# Patient Record
Sex: FEMALE | Race: BLACK OR AFRICAN AMERICAN
[De-identification: names, ages, dates, MRNs, and addresses within clinical notes are randomized per-mention and may not be internally consistent; named-entity substitution may affect disease eponyms.]

---

## 2017-03-09 ENCOUNTER — HOSPITAL ENCOUNTER (EMERGENCY)
Dept: HOSPITAL 68 - ERH | Age: 42
End: 2017-03-09
Payer: COMMERCIAL

## 2017-03-09 VITALS — SYSTOLIC BLOOD PRESSURE: 116 MMHG | DIASTOLIC BLOOD PRESSURE: 76 MMHG

## 2017-03-09 DIAGNOSIS — T24.002A: Primary | ICD-10-CM

## 2017-03-09 DIAGNOSIS — K08.89: ICD-10-CM
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
## 2017-03-14 ENCOUNTER — HOSPITAL ENCOUNTER (EMERGENCY)
Dept: HOSPITAL 68 - ERH | Age: 42
End: 2017-03-14
Payer: COMMERCIAL

## 2017-03-14 VITALS — SYSTOLIC BLOOD PRESSURE: 130 MMHG | DIASTOLIC BLOOD PRESSURE: 86 MMHG

## 2017-03-14 VITALS — WEIGHT: 174 LBS | BODY MASS INDEX: 28.99 KG/M2 | HEIGHT: 65 IN

## 2017-03-14 DIAGNOSIS — K08.89: ICD-10-CM

## 2017-03-14 DIAGNOSIS — K04.7: Primary | ICD-10-CM
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
## 2017-03-19 ENCOUNTER — HOSPITAL ENCOUNTER (EMERGENCY)
Dept: HOSPITAL 68 - ERH | Age: 42
End: 2017-03-19
Payer: COMMERCIAL

## 2017-03-19 VITALS — SYSTOLIC BLOOD PRESSURE: 104 MMHG | DIASTOLIC BLOOD PRESSURE: 64 MMHG

## 2017-03-19 VITALS — BODY MASS INDEX: 28.32 KG/M2 | WEIGHT: 170 LBS | HEIGHT: 65 IN

## 2017-03-19 DIAGNOSIS — R10.32: Primary | ICD-10-CM

## 2017-03-19 NOTE — ED GI/GU/ABDOMINAL COMPLAINT
History of Present Illness
 
General
Chief Complaint: General Adult
Stated Complaint: "PERIOD CRAMPS"
Source: patient, old records
Exam Limitations: no limitations
 
Vital Signs & Intake/Output
Vital Signs & Intake/Output
 Vital Signs
 
 
Date Time Temp Pulse Resp B/P Pulse O2 O2 Flow FiO2
 
     Ox Delivery Rate 
 
03/19 2116 98.1 73 16 104/64 98 Room Air  
 
03/19 1945 97.5 72 18 136/79 98 Room Air  
 
 
 ED Intake and Output
 
 
 03/20 0000 03/19 1200
 
Intake Total 0 
 
Output Total  
 
Balance 0 
 
   
 
Intake, Oral 0 
 
Patient 170 lb 
 
Weight  
 
 
Allergies
Coded Allergies:
latex (Severe, ITCHY RASH 11/04/16)
lactose (Severe, LACTOSE INTOLERANT 11/04/16)
tramadol (Severe, ITCHING 11/04/16)
 
Reconcile Medications
Amoxicillin 875 MG TABLET   1 TAB PO BID DENTAL INFECTION
Hydrocodone/Acetaminophen (Norco 5-325 Tablet) 5 MG-325 MG TABLET   1 TAB PO 
BIDP PRN BREAKTHROUGH PAIN
Ibuprofen 800 MG TABLET   1 TAB PO PRN PAIN  (Reported)
 
Triage Note:
PT TO TRIAGE WITH C/O LLQ ABD PAIN 8/10 SINCE THIS
 MORNING. HX OF OVARIAN CYST. PT DENIES N/V/D,
 DENIES URINARY S/S, DENIES CHEST PAIN,SOB.
 PT AFEBRILE IN TRIAGE, VSS.
Triage Nurses Notes Reviewed? yes
LMP (ages 10-50): now
Pregnant? N
Is pt currently breastfeeding? No
Onset: Abrupt
Duration: day(s): (1), constant
Timing: recent history
Quality/Severity: aching, cramping
Severity Numbers: 4
Location: suprapubic
Radiation: suprapubic
Activities at Onset: none
Prior Abdominal Problems: similar symptoms (H/O OVARIAN CYST)
No Modifying Factors: none
Associated Symptoms: DENIES
HPI:
41-year-old female with history of ovarian cyst presents emergency room 
complaining of suprapubic abdominal pain described as cramping 8 out of 10 
intermittent waxing and waning in intensity that came on today.  The patient 
states her menstrual cycle began today as well as scheduled.  She has a history 
of ovarian cyst and is followed by Dr. Huerta.  She states this pain feels 
similar.  It is nonradiating.  She took her last Vicodin today which she had 
after being seen here last week for a burn states helped with the pain.  She 
denies any diarrhea.  She denies chance of pregnancy vaginal discharge.  No 
history of sexual transmitted disease.  No fever no chills no urinary urgency 
frequency dysuria.
 
(LUIS CHRISTOPHER)
 
Past History
 
Travel History
Traveled to [place] past 21 day No
 
Medical History
Any Pertinent Medical History? see below for history
Neurological: NONE
EENT: NONE
Cardiovascular: NONE
Respiratory: asthma
Gastrointestinal: peptic ulcer disease, GASTRITIS
Hepatic: NONE
Renal: NONE
Musculoskeletal: sciatica, CARPAL TUNNEL C5/6 HERNIATED DISCS
Psychiatric: anxiety, insomnia, opioid dependence, PANIC ATTACKS
Endocrine: NONE
Blood Disorders: SMV THROMBOSIS "CLOTTING DISORDER"
Cancer(s): NONE
GYN/Reproductive: NONE
Tetanus Vaccine: 01/24/15
 
Surgical History
Surgical History: non-contributory
 
Psychosocial History
Who do you live with Friend
Services at Home None
What is your primary language English
Tobacco Use: Current Daily Use
Daily Tobacco Use Amount/Type: => 5 Cigarettes daily
 
Family History
Hx Contributory? No
(LUIS CHRISTOPHER)
 
Review of Systems
 
Review of Systems
Constitutional:
Reports: see HPI. 
All Other Systems: Reviewed and Negative
Comments
Review of systems: See HPI, All other systems negative.
Constitutional, no chills no fever, no malaise 
HEENT: no sore throat no congestion, no ear pain
Cardiovascular: No chest pain , no palpitation 
Skin,  no rashes, no change in skin
Respiratory: No dyspnea no cough no  sputum no  hemoptysis
GI: No nausea no vomiting, no diarrhea, no bloating/constipation
: No dysuria No hematuria, no frequency, no discharge
Muscle skeletal: No joint pain, no back pain, no neck pain,
Neurologic:  no headache
Psych: No stress 
Heme/endocrine: No bruising no bleeding 
Immunology: No lymphadenopathy,
(LUIS CHRISTOPHER)
 
Physical Exam
 
Physical Exam
General Appearance: well developed/nourished, alert, awake
Gastrointestinal: soft
Comments:
Well-developed well-nourished person in no acute distress
HEENT: Normal EENT exam; PERRL, EOMI, HEAD is atraumatic. moist mucous 
membranes.  
Neck: Supple,  normal range of motion 
Back: Nontender, no CVA tenderness. Full range of motion
Cardiovascular: Regular rate and rhythms no murmurs rubs
Respiratory:  No respiratory distress.  Patient speaking in full complete 
sentences. Breath sounds clear to auscultation bilaterally: NO W/R/R
Abdomen: Soft, nontender nondistended, no appreciable organomegaly. Normal bowel
sounds. No rebound/guarding, is no right lower quadrant tenderness negative 
Rovsing sign and negative obturator sign
Extremity: No edema, full range of motion of extremities,
Neuro: Alert oriented x3, motor sensory normal, There were no obvious focal 
neurologic abnormalities.
Skin: No appreciable rash on exposed skin, skin is warm and dry.
Psych: Mood and affect is normal, memory and judgment is normal.
 
Core Measures
ACS in differential dx? No
Severe Sepsis Present: No
Septic Shock Present: No
(LUIS CHRISTOPHER)
 
Progress
Differential Diagnosis: ectopic pregnancy, inflamm bowel dis, intrauterine 
pregnancy, ovarian cyst, ovarian torsion, SBO, threatened AB, UTI/pyelo
Plan of Care:
 Orders
 
 
Procedure Date/time Status
 
URINE PREGNANCY 03/19 2016 Complete
 
URINALYSIS 03/19 2016 Complete
 
 
 Laboratory Tests
 
 
 
03/19/17 2024:
Urinalysis MANY  H, Urine Color PINK  H, Urine Clarity CLEAR, Urine pH 6.0, Ur 
Specific Gravity <= 1.005, Urine Protein 100  H, Urine Ketones NEG, Urine 
Nitrite NEG, Urine Bilirubin NEG, Urine Urobilinogen 0.2, Ur Leukocyte Esterase 
TRACE  H, Ur Microscopic SEDIMENT EXAMINED, Urine RBC 10-15  H, Urine WBC 1-3  H
, Ur Epithelial Cells FEW, Urine Bacteria FEW  H, Urine Mucus FEW, Urine 
Hemoglobin LARGE  H, Urine Glucose NEG, Urine Pregnancy Test NEGATIVE
Patient clinically appears well, I advised she follow up with her OB/GYN Dr. Worley tomorrow she may need an outpatient ultrasound prescription for Vicodin 
was provided, advised she continue taking ibuprofen 800 mg every 8 hours heating
pads, return anytime sooner with any concerns she feels comfortable this plan 
answered all her questions cleared for discharge
(LUIS CHRISTOPHER)
Initial ED EKG: none
(LUIS CHRISTOPHER)
 
Departure
 
Departure
Time of Disposition: 2109
Disposition: HOME OR SELF CARE
Condition: Stable
Clinical Impression
Primary Impression: Abdominal pain
Referrals:
LESLY WATTS,GREYSON HENRY (PCP/Family)
 
Additional Instructions:
Follow-up with your OB/GYN tomorrow as you may require an outpatient ultrasound.
 Vicodin for breakthrough pain.  Use caution as this is a narcotic and highly 
addictive no driving or drinking alcohol while taking.  Ibuprofen 800 mg every 8
hours
Departure Forms:
Customer Survey
General Discharge Information
Prescriptions:
Current Visit Scripts
Hydrocodone/Acetaminophen (Norco 5-325 Tablet) 1 TAB PO BIDP PRN BREAKTHROUGH 
PAIN
     #8 TAB 
 
 
(MIRIAM RIDDLE,LUIS)
 
PA/NP Co-Sign Statement
Statement:
ED Attending supervision documentation-
 
[] I saw and evaluated the patient. I have also reviewed all the pertinent lab 
results and diagnostic results. I agree with the findings and the plan of care 
as documented in the PA's/NP's documentation. 
 
[x] I have reviewed the ED Record and agree with the PA's/NP's documentation.
 
[] Additions or exceptions (if any) to the PAs/NP's note and plan are 
summarized below:
[]
 
(JAYNA WATTS,JOSE MATOS)

## 2017-03-30 ENCOUNTER — HOSPITAL ENCOUNTER (EMERGENCY)
Dept: HOSPITAL 68 - ERH | Age: 42
LOS: 1 days | End: 2017-03-31
Payer: COMMERCIAL

## 2017-03-30 VITALS — WEIGHT: 170 LBS | BODY MASS INDEX: 28.32 KG/M2 | HEIGHT: 65 IN

## 2017-03-30 DIAGNOSIS — K08.89: Primary | ICD-10-CM

## 2017-03-31 VITALS — SYSTOLIC BLOOD PRESSURE: 127 MMHG | DIASTOLIC BLOOD PRESSURE: 75 MMHG

## 2017-03-31 NOTE — ED THROAT/DENTAL COMPLAINT
History of Present Illness
 
General
Chief Complaint: Sore Throat, Dental Pain
Stated Complaint: DENTAL PAIN
Source: patient
Exam Limitations: no limitations
 
Vital Signs & Intake/Output
Vital Signs & Intake/Output
 Vital Signs
 
 
Date Time Temp Pulse Resp B/P Pulse O2 O2 Flow FiO2
 
     Ox Delivery Rate 
 
03/31 0016 98.9 88 18 127/75 97 Room Air  
 
03/30 2225 99.2 92 18 143/88 96 Room Air  
 
 
 ED Intake and Output
 
 
 03/31 0000 03/30 1200
 
Intake Total  
 
Output Total  
 
Balance  
 
   
 
Patient 170 lb 
 
Weight  
 
 
Allergies
Coded Allergies:
latex (Severe, ITCHY RASH 11/04/16)
lactose (Severe, LACTOSE INTOLERANT 11/04/16)
tramadol (Severe, ITCHING 11/04/16)
 
Reconcile Medications
Amoxicillin 875 MG TABLET   1 TAB PO BID DENTAL INFECTION
Hydrocodone/Acetaminophen (Hydrocodon-Acetaminophen 5-325) 5 MG-325 MG TABLET   
1-2 TAB PO Q4-6 PRN PRN pain
Hydrocodone/Acetaminophen (Norco 5-325 Tablet) 5 MG-325 MG TABLET   1 TAB PO 
BIDP PRN BREAKTHROUGH PAIN
Ibuprofen 800 MG TABLET   1 TAB PO PRN PAIN  (Reported)
 
Triage Note:
PT TO ED C/O RT LOWER TOOTH PAIN TODAY
Triage Nurses Notes Reviewed? yes
Onset: Abrupt
Duration: day(s):, intermittent
Timing: recent history
Severity: moderate, severe
No Modifying Factors: none
Pregnant: No
Patient currently breastfeeds: No
HPI:
41-year-old female comes into emergency room with complaints of dental pain has 
been going on for the past few days.  History of dental pain previously.  Pain 
is sharp.  Continuous.  Located in the right frontal tooth as well as right 
upper molar.  Denies any other associated symptoms.  Nonradiating.
(JUANITA HIGGINBOTHAM)
 
Past History
 
Travel History
Traveled to [place] past 21 day No
 
Medical History
Any Pertinent Medical History? see below for history
Neurological: NONE
EENT: NONE
Cardiovascular: NONE
Respiratory: asthma
Gastrointestinal: peptic ulcer disease, GASTRITIS
Hepatic: NONE
Renal: NONE
Musculoskeletal: sciatica, CARPAL TUNNEL C5/6 HERNIATED DISCS
Psychiatric: anxiety, insomnia, opioid dependence, PANIC ATTACKS
Endocrine: NONE
Blood Disorders: SMV THROMBOSIS "CLOTTING DISORDER"
Cancer(s): NONE
GYN/Reproductive: NONE
Tetanus Vaccine: 01/24/15
 
Surgical History
Surgical History: non-contributory
 
Psychosocial History
Who do you live with Friend
Services at Home None
What is your primary language English
Tobacco Use: Current Daily Use
Daily Tobacco Use Amount/Type: => 5 Cigarettes daily
ETOH Use: occasional use
Illicit Drug Use: denies illicit drug use
 
Family History
Hx Contributory? No
(JUANITA HIGGINBOTHAM)
 
Review of Systems
 
Review of Systems
Constitutional:
Reports: no symptoms. 
EENTM:
Reports: see HPI. 
Respiratory:
Reports: no symptoms. 
Cardiovascular:
Reports: no symptoms. 
GI:
Reports: no symptoms. 
Genitourinary:
Reports: no symptoms. 
Musculoskeletal:
Reports: no symptoms. 
Skin:
Reports: no symptoms. 
Neurological/Psychological:
Reports: no symptoms. 
Hematologic/Endocrine:
Reports: no symptoms. 
Immunologic/Allergic:
Reports: no symptoms. 
All Other Systems: Reviewed and Negative
(JUANITA HIGGINBOTHAM)
 
Physical Exam
 
Physical Exam
General Appearance: well developed/nourished, no apparent distress, alert
Head: atraumatic, normal appearance
Eyes:
Bilateral: normal appearance. 
Nose: normal inspection
Mouth/Throat: normal mouth inspection, dental tenderness
Neck: normal inspection, full range of motion
Cardiovascular/Respiratory: no respiratory distress
Back: normal inspection
Neurologic/Psych: awake, alert, oriented x 3
Skin: intact, normal color
 
Diagram
Dental:
 
  1) tenderness
  2) Tenderness
 
Core Measures
ACS in differential dx? No
Severe Sepsis Present: No
Septic Shock Present: No
(JUANITA HIGGINBOTHAM)
 
Progress
Differential Diagnosis: aspirated tooth, carious tooth, epiglottitis, Ludwigs 
angina, meningitis, odontogenic abscess, bradley-tonsillar abscess, pharyngeal for.
body, stomatitis/gingivitis, strep pharyngitis, tooth fracture
Plan of Care:
3/31/2017 12:16:01 AM
 
Patient clinically looks well.  Nontoxic-appearing.  No signs of infection.  At 
this time patient will not be started on oral antibiotics.  Follow-up with 
dentist.  Return if any other concerns worsening symptoms.
(JUANITA HIGGINBOTHAM)
 
Departure
 
Departure
Disposition: HOME OR SELF CARE
Condition: Stable
Clinical Impression
Primary Impression: Odontalgia
Referrals:
UNKNOWN (PCP/Family)
 
Additional Instructions:
Take Vicodin for pain.  Follow-up with dentist.  Return if any other concerns 
worsening symptoms.
 
Please go over all results of today's visit with your primary care doctor.  
Contact your primary care doctor to let them know you were here in the emergency
room.  There may be nonspecific findings which may not be related to your visit 
today here in the emergency room but may require further evaluation and chronic 
monitoring by your primary care doctor.  If you had a laceration today the 
chance of foreign body always remains. You should follow-up with your primary 
care doctor for recheck in 3-5 days for a wound check.  If you had an x-ray done
there is a chance that a fracture could have been missed on initial read and you
should follow-up with your primary care doctor for repeat x-rays if symptoms 
persist.  If your blood pressure was elevated here in the emergency room please 
have rechecked by her primary care doctor within the next 48 hours by your 
primary care doctor.  If you were prescribed a narcotic here in the emergency 
room or any type of controlled substances you're not allowed to drive while 
taking this medication or operate any type of heavy machinery.  Narcotics can 
make you feel lightheaded dizziness nausea and can cause constipation.  You may 
need to  a stool softener.  Thank you for choosing [facility] 
emergency room.  Please return to the emergency room immediately if you have any
other concerns worsening of symptoms.
 
Departure Forms:
Customer Survey
General Discharge Information
Prescriptions:
Current Visit Scripts
Hydrocodone/Acetaminophen (Hydrocodon-Acetaminophen 5-325) 1-2 TAB PO Q4-6 PRN 
PRN pain
     #8 TAB 
 
 
(JUANITA HIGGINBOTHAM)
 
PA/NP Co-Sign Statement
Statement:
ED Attending supervision documentation-
 
[] I saw and evaluated the patient. I have also reviewed all the pertinent lab 
results and diagnostic results. I agree with the findings and the plan of care 
as documented in the PA's/NP's documentation. 
 
x I have reviewed the ED Record and agree with the PA's/NP's documentation.
 
[] Additions or exceptions (if any) to the PAs/NP's note and plan are 
summarized below:
[]
 
(JODY WATTS,JOEL)

## 2017-04-12 ENCOUNTER — HOSPITAL ENCOUNTER (EMERGENCY)
Dept: HOSPITAL 68 - ERH | Age: 42
End: 2017-04-12
Payer: COMMERCIAL

## 2017-04-12 VITALS — DIASTOLIC BLOOD PRESSURE: 82 MMHG | SYSTOLIC BLOOD PRESSURE: 122 MMHG

## 2017-04-12 VITALS — BODY MASS INDEX: 28.32 KG/M2 | HEIGHT: 65 IN | WEIGHT: 170 LBS

## 2017-04-12 DIAGNOSIS — J40: Primary | ICD-10-CM

## 2017-04-12 DIAGNOSIS — Z72.0: ICD-10-CM

## 2017-04-12 NOTE — RADIOLOGY REPORT
EXAMINATION:
CHEST 2 VIEWS
 
CLINICAL INFORMATION:
Cough, fever.
 
COMPARISON:
02/06/2014.
 
TECHNIQUE:
PA and lateral views of the chest were obtained.
 
FINDINGS:
The cardiac silhouette is not enlarged. The mediastinal and hilar contours
are unremarkable. There are neither pleural effusions nor pneumothoraces.
There are no consolidations. The osseous structures are unremarkable.
 
IMPRESSION:
No evidence for acute disease.

## 2017-04-19 ENCOUNTER — HOSPITAL ENCOUNTER (EMERGENCY)
Dept: HOSPITAL 68 - ERH | Age: 42
End: 2017-04-19
Payer: COMMERCIAL

## 2017-04-19 VITALS — DIASTOLIC BLOOD PRESSURE: 75 MMHG | SYSTOLIC BLOOD PRESSURE: 119 MMHG

## 2017-04-19 VITALS — HEIGHT: 65 IN | BODY MASS INDEX: 28.32 KG/M2 | WEIGHT: 170 LBS

## 2017-04-19 DIAGNOSIS — K08.89: Primary | ICD-10-CM

## 2017-04-19 NOTE — ED THROAT/DENTAL COMPLAINT
History of Present Illness
 
General
Chief Complaint: Sore Throat, Dental Pain
Stated Complaint: DENTAL PAIN
Source: patient, old records
Exam Limitations: no limitations
 
Vital Signs & Intake/Output
Vital Signs & Intake/Output
 Vital Signs
 
 
Date Time Temp Pulse Resp B/P B/P Pulse O2 O2 Flow FiO2
 
     Mean Ox Delivery Rate 
 
04/19 1405 99.0 72 18 119/75  97 Room Air  
 
 
Allergies
Coded Allergies:
latex (Severe, ITCHY RASH 04/12/17)
lactose (Severe, LACTOSE INTOLERANT 04/12/17)
tramadol (Severe, ITCHING 04/12/17)
 
Reconcile Medications
Amoxicillin 500 MG TABLET   1 TAB PO TID BRONCHITIS
Codeine Phosphate/Guaifenesi (Cheratussin AC Syrup) 10 MG-100 MG/5 ML LIQUID   
10 ML PO Q6P PRN COUGH
Hydrocodone/Acetaminophen (Norco 5-325 Tablet) 5 MG-325 MG TABLET   1 TAB PO Q4-
6 PRN PRN pain
Ibuprofen 800 MG TABLET   1 TAB PO TID PRN pain
 
Triage Note:
PT TO TRIAGE WITH TOOTHACHE 10/10, PT TOOK TYLENOL
THIS MORNING WITH NO PAIN RELIEF. VSS.
Triage Nurses Notes Reviewed? yes
Onset: Abrupt
Duration: day(s): (2), constant
Timing: recent history
Injury Environment: home
Severity: moderate
Severity Numbers: 10
Modifying Factors:
Worsens With: eating. 
Associated Symptoms: DENIES
Pregnant: No
Patient currently breastfeeds: No
HPI:
41-year-old female presents emergency room complaining of a severe right lower 
toothache 10 out of 10 for the past 2 days at the site of a previously cracked 
tooth.  She states she is scheduled to see a dentist on Friday.  She's been 
taking Tylenol without any improvement.  Pain is worse with palpation eating.  
She states she was using a toothpick today and believes she made the pain 
worse.No sore throat no difficulty swallowing no fever no chills
(LUIS CHRISTOPHER)
 
Past History
 
Travel History
Traveled to [place] past 21 day No
 
Medical History
Any Pertinent Medical History? see below for history
Neurological: NONE
EENT: NONE
Cardiovascular: NONE
Respiratory: asthma
Gastrointestinal: peptic ulcer disease, GASTRITIS
Hepatic: NONE
Renal: NONE
Musculoskeletal: sciatica, CARPAL TUNNEL C5/6 HERNIATED DISCS
Psychiatric: anxiety, insomnia, opioid dependence, PANIC ATTACKS
Endocrine: NONE
Blood Disorders: SMV THROMBOSIS "CLOTTING DISORDER"
Cancer(s): NONE
GYN/Reproductive: NONE
Tetanus Vaccine: 01/24/15
 
Surgical History
Surgical History: non-contributory
 
Psychosocial History
Who do you live with Friend
Services at Home None
What is your primary language English
Tobacco Use: Current Daily Use
Daily Tobacco Use Amount/Type: => 5 Cigarettes daily
 
Family History
Hx Contributory? No
(LUIS CHRISTOPHER)
 
Review of Systems
 
Review of Systems
Constitutional:
Reports: see HPI. 
All Other Systems: Reviewed and Negative
Comments
Review of systems: See HPI, All other systems negative.
Constitutional, no chills no fever, no malaise 
HEENT: No visual changes no sore throat no congestion, no ear pain
Cardiovascular: No chest pain , no palpitation 
Skin:  no rashes, no change in skin
Respiratory: No dyspnea no cough no  sputum
GI: No nausea no vomiting, no diarrhea,
: No dysuria
Muscle skeletal: No joint pain,  no back pain, no neck pain,
Neurologic:  no headache
Psych: No stress 
Heme/endocrine: No bruising no bleeding
Immunology: No lymphadenopathy
(LUIS CHRISTOPHER)
 
Physical Exam
 
Physical Exam
General Appearance: well developed/nourished, no apparent distress, alert, awake
Mouth/Throat: dental tenderness (R LOWER)
Comments:
Well-developed well-nourished patient in no apparent distress.
Head/Face: Atraumatic, no maxillary/frontal sinus tenderness, no facial swelling
Eyes: PERRL, EOMI, no conjunctival injection
Ear:External auditory canals clear
Nose: atraumatic.Normal inspection: No bleeding
Throat: Right lower dental tenderness cracked tooth Moist mucous 
membranes.Pharynx normal.  No pharyngeal erythema/exudate seen. No stridor/
drooling or assymetry. No swelling or edema. 
Neck: Supple, no lymphadenopathy, FROM
Back: FROM
Cardiovascular: Regular rate and rhythms no murmurs rubs or gallops, 
Respiratory: No respiratory distress.  Patient speaking in full complete 
sentences. Breath sounds clear to auscultation bilaterally: NO W/R/R
Extremities: full range of motion
Neuro: awake, alert, and oriented to person, place and time. There were no 
obvious focal neurologic abnormalities.
Skin: Warm & dry;No appreciable rash on exposed skin
Psych: Mood affect normal, normal memory normal judgment.
 
 
Core Measures
ACS in differential dx? No
Severe Sepsis Present: No
Septic Shock Present: No
(LUIS CHRISTOPHER)
 
Progress
Differential Diagnosis: aspirated tooth, carious tooth, epiglottitis, Ludwigs 
angina, odontogenic abscess, bradley-tonsillar abscess, stomatitis/gingivitis, 
tooth fracture
Plan of Care:
Advised patient follow up with her dentist as scheduled on Friday she is 
currently on amoxicillin for an upper respiratory infection.  Prescription for 
Norco provided advised to use caution as this will make her drowsy no driving or
drinking alcohol while taking.  CT  was reviewed she feels comfortable plan
(LUIS CHRISTOPHER)
 
Departure
 
Departure
Time of Disposition: 1442
Disposition: HOME OR SELF CARE
Condition: Stable
Clinical Impression
Primary Impression: Toothache
Referrals:
UNKNOWN (PCP/Family)
 
Additional Instructions:
Norco as directed ibuprofen 800 mg as needed follow-up with your dentist as 
scheduled this week these prescriptions were sent to your pharmacy
Departure Forms:
Customer Survey
General Discharge Information
Prescriptions:
Current Visit Scripts
Hydrocodone/Acetaminophen (Norco 5-325 Tablet) 1 TAB PO Q4-6 PRN PRN pain 
     #8 TAB 
 
Ibuprofen 1 TAB PO TID PRN pain 
     #30 TAB 
 
 
(LUIS CHRISTOPHER)
 
PA/NP Co-Sign Statement
Statement:
ED Attending supervision documentation-
 
[] I saw and evaluated the patient. I have also reviewed all the pertinent lab 
results and diagnostic results. I agree with the findings and the plan of care 
as documented in the PA's/NP's documentation. 
 
[X] I have reviewed the ED Record and agree with the PA's/NP's documentation.
 
[] Additions or exceptions (if any) to the PAs/NP's note and plan are 
summarized below:
[]
 
(PEGGY WATTS,KRISTOPHER)

## 2017-05-17 ENCOUNTER — HOSPITAL ENCOUNTER (EMERGENCY)
Dept: HOSPITAL 68 - ERH | Age: 42
End: 2017-05-17
Payer: COMMERCIAL

## 2017-05-17 VITALS — DIASTOLIC BLOOD PRESSURE: 62 MMHG | SYSTOLIC BLOOD PRESSURE: 101 MMHG

## 2017-05-17 VITALS — HEIGHT: 65 IN | BODY MASS INDEX: 26.66 KG/M2 | WEIGHT: 160 LBS
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
## 2017-05-17 DIAGNOSIS — S02.5XXA: Primary | ICD-10-CM
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
## 2017-05-17 NOTE — ED THROAT/DENTAL COMPLAINT
History of Present Illness
 
General
Chief Complaint: Sore Throat, Dental Pain
Stated Complaint: DEMTAL PAIN
Source: patient, old records
Exam Limitations: no limitations
 
Vital Signs & Intake/Output
Vital Signs & Intake/Output
 Vital Signs
 
 
Date Time Temp Pulse Resp B/P B/P Pulse O2 O2 Flow FiO2
 
     Mean Ox Delivery Rate 
 
05/17 1148 97.6 83 16 101/62  100 Room Air  
 
 
Allergies
Coded Allergies:
latex (Severe, ITCHY RASH 04/12/17)
lactose (Severe, LACTOSE INTOLERANT 04/12/17)
tramadol (Severe, ITCHING 04/12/17)
 
Reconcile Medications
Escitalopram Oxalate (Lexapro) 10 MG TABLET   3 TAB PO DAILY MENTAL HEALTH  (
Reported)
Hydrocodone/Acetaminophen (Norco 5-325 Tablet) 5 MG-325 MG TABLET   1-2 TAB PO 
Q4-6 PRN PRN DENTAL PAIN
Hydroxyzine Pamoate (Vistaril) 50 MG CAPSULE   1 CAP PO 4 TIMES/DAY MENTAL 
HEALTH  (Reported)
Ibuprofen 600 MG TABLET   1 TAB PO TID PRN PAIN
     with food
Pantoprazole Sodium (Protonix) 40 MG TABLET.DR   1 TAB PO DAILY GI  (Reported)
Quetiapine Fumarate (Seroquel) 200 MG TABLET   1 TAB PO QPM MENTAL HEALTH  (
Reported)
Trazodone HCl 50 MG TABLET   1 TAB PO QPM SLEEP  (Reported)
 
Triage Note:
42 Y/O FEMALE C/O DENTAL PAIN.  TOOK 800MG MOTRIN
 AROUND 0800 WITH NO RELIEF
Triage Nurses Notes Reviewed? yes
Pregnant: No
Patient currently breastfeeds: No
HPI:
Patient presents with toothache to her right lower jaw.  Patient states that she
has broken tooth and is due to have it extracted later this week.  The pain is 
10 out of 10.  There are no mitigating factors.  The pain worsened with cold 
liquids.  The pain is constant.  Patient was seen here a month ago with similar 
complaints.
 
Past History
 
Travel History
Traveled to [place] past 21 day No
 
Medical History
Any Pertinent Medical History? see below for history
Neurological: NONE
EENT: NONE
Cardiovascular: NONE
Respiratory: asthma
Gastrointestinal: peptic ulcer disease, GASTRITIS
Hepatic: NONE
Renal: NONE
Musculoskeletal: sciatica, CARPAL TUNNEL C5/6 HERNIATED DISCS
Psychiatric: anxiety, insomnia, opioid dependence, PANIC ATTACKS
Endocrine: NONE
Blood Disorders: SMV THROMBOSIS "CLOTTING DISORDER"
Cancer(s): NONE
GYN/Reproductive: NONE
Tetanus Vaccine: 01/24/15
 
Surgical History
Surgical History: non-contributory
 
Psychosocial History
Who do you live with Friend
Services at Home None
What is your primary language English
Tobacco Use: Current Daily Use
Daily Tobacco Use Amount/Type: =< 4 Cigarettes daily
ETOH Use: occasional use
Illicit Drug Use: denies illicit drug use
 
Family History
Hx Contributory? No
 
Review of Systems
 
Review of Systems
Constitutional:
Reports: no symptoms. 
EENTM:
Reports: see HPI, tooth pain. 
Respiratory:
Reports: no symptoms. 
Cardiovascular:
Reports: no symptoms. 
GI:
Reports: no symptoms. 
Musculoskeletal:
Reports: no symptoms. 
Neurological/Psychological:
Reports: no symptoms. 
 
Physical Exam
 
Physical Exam
General Appearance: well developed/nourished, alert, awake
Head: atraumatic
Eyes:
Bilateral: PERRL, EOMI. 
Mouth/Throat: dental tenderness
Neck: normal inspection, supple, full range of motion
Cardiovascular/Respiratory: normal breath sounds, normal peripheral pulses, 
regular rate/rhythm, no respiratory distress
Neurologic/Psych: no motor/sensory deficits, awake, alert, oriented x 3, normal 
gait, normal mood/affect
 
Core Measures
ACS in differential dx? No
Severe Sepsis Present: No
Septic Shock Present: No
 
Progress
Differential Diagnosis: odontogenic abscess, bradley-tonsillar abscess, tooth 
fracture
Plan of Care:
Pain control and follow-up with oral surgery
 
Departure
 
Departure
Disposition: HOME OR SELF CARE
Condition: Stable
Clinical Impression
Primary Impression: Broken tooth
Qualifiers:  Encounter type: subsequent encounter Fracture type: closed Fracture
healing: with delayed healing Qualified Code: S02.5XXG - Fracture of tooth (
traumatic), subsequent encounter for fracture with delayed healing
Referrals:
UNKNOWN (PCP/Family)
 
Additional Instructions:
FOLLOW UP WITH YOUR ORAL SURGEON THIS WEEK
RETURN FOR ANY CONCERNS
Departure Forms:
Customer Survey
General Discharge Information
Prescriptions:
Current Visit Scripts
Hydrocodone/Acetaminophen (Norco 5-325 Tablet) 1-2 TAB PO Q4-6 PRN PRN DENTAL 
PAIN 
     #16 TAB 
 
Ibuprofen 1 TAB PO TID PRN PAIN 
     #20 TAB 
     with food

## 2017-05-26 ENCOUNTER — HOSPITAL ENCOUNTER (EMERGENCY)
Dept: HOSPITAL 68 - ERH | Age: 42
LOS: 1 days | End: 2017-05-27
Payer: COMMERCIAL

## 2017-05-26 VITALS — WEIGHT: 160 LBS | BODY MASS INDEX: 26.66 KG/M2 | HEIGHT: 65 IN

## 2017-05-26 DIAGNOSIS — R10.13: ICD-10-CM

## 2017-05-26 DIAGNOSIS — K52.9: Primary | ICD-10-CM

## 2017-05-27 VITALS — DIASTOLIC BLOOD PRESSURE: 78 MMHG | SYSTOLIC BLOOD PRESSURE: 138 MMHG

## 2017-05-27 LAB
ABSOLUTE GRANULOCYTE CT: 4.5 /CUMM (ref 1.4–6.5)
BASOPHILS # BLD: 0 /CUMM (ref 0–0.2)
BASOPHILS NFR BLD: 0.8 % (ref 0–2)
EOSINOPHIL # BLD: 0.1 /CUMM (ref 0–0.7)
EOSINOPHIL NFR BLD: 0.9 % (ref 0–5)
ERYTHROCYTE [DISTWIDTH] IN BLOOD BY AUTOMATED COUNT: 16.5 % (ref 11.5–14.5)
GRANULOCYTES NFR BLD: 73.9 % (ref 42.2–75.2)
HCT VFR BLD CALC: 35 % (ref 37–47)
LYMPHOCYTES # BLD: 1.4 /CUMM (ref 1.2–3.4)
MCH RBC QN AUTO: 25 PG (ref 27–31)
MCHC RBC AUTO-ENTMCNC: 31.7 G/DL (ref 33–37)
MCV RBC AUTO: 79 FL (ref 81–99)
MONOCYTES # BLD: 0.1 /CUMM (ref 0.1–0.6)
PLATELET # BLD: 232 /CUMM (ref 130–400)
PMV BLD AUTO: 10.3 FL (ref 7.4–10.4)
RED BLOOD CELL CT: 4.44 /CUMM (ref 4.2–5.4)
WBC # BLD AUTO: 6.1 /CUMM (ref 4.8–10.8)

## 2017-05-27 NOTE — ED GI/GU/ABDOMINAL COMPLAINT
History of Present Illness
 
General
Chief Complaint: Nausea, Vomiting, Diarrhea
Stated Complaint: BIBA C/O N/V X'S 10 HRS
Source: patient
Exam Limitations: no limitations
 
Vital Signs & Intake/Output
Vital Signs & Intake/Output
 Vital Signs
 
 
Date Time Temp Pulse Resp B/P B/P Pulse O2 O2 Flow FiO2
 
     Mean Ox Delivery Rate 
 
05/26 2359      97  Room Air 
 
05/26 2357 96.2 67 18 148/82  97 Room Air  
 
05/26 2357 98.4 76 18 148/82  98 Room Air  
 
 
 ED Intake and Output
 
 
 05/27 0000 05/26 1200
 
Intake Total  
 
Output Total  
 
Balance  
 
   
 
Patient 160 lb 
 
Weight  
 
 
Allergies
Coded Allergies:
latex (Severe, ITCHY RASH 04/12/17)
lactose (Severe, LACTOSE INTOLERANT 04/12/17)
tramadol (Severe, ITCHING 04/12/17)
 
Reconcile Medications
Escitalopram Oxalate (Lexapro) 10 MG TABLET   3 TAB PO DAILY MENTAL HEALTH  (
Reported)
Hydrocodone/Acetaminophen (Norco 5-325 Tablet) 5 MG-325 MG TABLET   1-2 TAB PO 
Q4-6 PRN PRN DENTAL PAIN
Hydroxyzine Pamoate (Vistaril) 50 MG CAPSULE   1 CAP PO 4 TIMES/DAY MENTAL 
HEALTH  (Reported)
Ibuprofen 600 MG TABLET   1 TAB PO TID PRN PAIN
     with food
Omeprazole Magnesium (Prilosec Otc) 20 MG TABLET.DR   1 TAB PO DAILY STOMACH 
UPSET
Ondansetron (Zofran Odt) 4 MG TAB.RAPDIS   1 TAB SL TID PRN NAUSEA
Pantoprazole Sodium (Protonix) 40 MG TABLET.DR   1 TAB PO DAILY GI  (Reported)
Quetiapine Fumarate (Seroquel) 200 MG TABLET   1 TAB PO QPM MENTAL HEALTH  (
Reported)
Trazodone HCl 50 MG TABLET   1 TAB PO QPM SLEEP  (Reported)
 
Triage Note:
PT BIBA FROM HOME C/O NAUSEA/VOMITNG AND ABD
 BLOATING SINCE 2PM
 . PT ALSO C/O FEELING "ANXIOUS."
Triage Nurses Notes Reviewed? yes
Pregnant? n
Is pt currently breastfeeding? No
Onset: Gradual
Duration: hour(s):
Timing: recent history
Quality/Severity: cramping
Location: epigastric
Radiation: no radiation
Activities at Onset: "I ate pizza and last night I ate steak"
Modifying Factors:
Worsens With: vomiting. 
Associated Symptoms: nausea/vomiting
HPI:
42yo woman
presents with 4 hours of nausea, vomiting and increased anxiety.
 
She notes that she ate some meat yesterday evening and had 3 pieces of pizza for
dinner tonight.
 
She has no fever, chills, dyspnea, rash, diarrhea.
 
She is otherwise well. 
 
Past History
 
Travel History
Traveled to [place] past 21 day No
 
Medical History
Any Pertinent Medical History? see below for history
Neurological: NONE
EENT: NONE
Cardiovascular: NONE
Respiratory: asthma
Gastrointestinal: peptic ulcer disease, GASTRITIS
Hepatic: NONE
Renal: NONE
Musculoskeletal: sciatica, CARPAL TUNNEL C5/6 HERNIATED DISCS
Psychiatric: anxiety, insomnia, opioid dependence, PANIC ATTACKS
Endocrine: NONE
Blood Disorders: SMV THROMBOSIS "CLOTTING DISORDER"
Cancer(s): NONE
GYN/Reproductive: NONE
Tetanus Vaccine: 01/24/15
 
Surgical History
Surgical History: non-contributory
 
Psychosocial History
Who do you live with Friend
Services at Home None
What is your primary language English
Tobacco Use: Refused to answer
ETOH Use: 6
 
Family History
Hx Contributory? No
 
Review of Systems
 
Review of Systems
Constitutional:
Reports: no symptoms. 
EENTM:
Reports: no symptoms. 
Respiratory:
Reports: no symptoms. 
Cardiovascular:
Reports: no symptoms. 
GI:
Reports: no symptoms. 
Genitourinary:
Reports: no symptoms. 
Musculoskeletal:
Reports: no symptoms. 
Skin:
Reports: no symptoms. 
Neurological/Psychological:
Reports: no symptoms. 
Hematologic/Endocrine:
Reports: no symptoms. 
Immunologic/Allergic:
Reports: no symptoms. 
All Other Systems: Reviewed and Negative
 
Physical Exam
 
Physical Exam
General Appearance: well developed/nourished, mild distress, moderate distress
Head: atraumatic, normal appearance
Eyes:
Bilateral: normal appearance. 
Ears, Nose, Throat, Mouth: hearing grossly normal
Neck: normal inspection, supple, full range of motion, normal alignment
Respiratory: normal breath sounds, chest non-tender, no respiratory distress, 
quiet respiration, lungs clear
Cardiovascular: regular rate/rhythm
Gastrointestinal: normal bowel sounds, soft, mild mid epigastric tenderness to 
palpation
Back: normal inspection, normal range of motion
Extremities: normal range of motion
Neurologic/Psych: no motor/sensory deficits, awake, alert, oriented x 3
Skin: intact, normal color, warm/dry
 
Core Measures
ACS in differential dx? No
Severe Sepsis Present: No
Septic Shock Present: No
 
Progress
Differential Diagnosis: viral syndrome, gastroenteritis, food poisoning. vs 
other. 
Plan of Care:
 Orders
 
 
Procedure Date/time Status
 
TROPONIN LEVEL 05/27 0003 Complete
 
LIPASE 05/27 0003 Complete
 
HEPATIC FUNCTION PANEL 05/27 0003 Complete
 
CBC WITHOUT DIFFERENTIAL 05/27 0003 Complete
 
BASIC METABOLIC PANEL 05/27 0003 Complete
 
AMYLASE 05/27 0003 Complete
 
EKG 05/27 0003 Active
 
 
 Current Medications
 
 
  Sig/Colten Start time  Last
 
Medication Dose  Stop Time Status Admin
 
Promethazine HCl 25 MG ONCE ONE 05/27 0245 UNVr 05/27
 
(Phenergen)   05/27 0246  0258
 
Sodium Chloride 1,000 ML BOLUS ONE 05/27 0245 UNVr 05/27
 
(Normal Saline 0.9%)   05/27 0344  0258
 
 
 Laboratory Tests
 
 
 
05/27/17 0025:
Anion Gap 10, Estimated GFR > 60, BUN/Creatinine Ratio 18.6, Glucose 96, Calcium
9.2, Total Bilirubin 0.4, Direct Bilirubin 0.3, AST 21, ALT 23, Alkaline 
Phosphatase 66, Troponin I < 0.01, Total Protein 7.1, Albumin 4.2, Amylase 54, 
Lipase 32, CBC w Diff NO MAN DIFF REQ, RBC 4.44, MCV 79.0  L, MCH 25.0  L, RDW 
16.5  H, MPV 10.3, Gran % 73.9, Lymphocytes % 22.7, Monocytes % 1.7, Eosinophils
% 0.9, Basophils % 0.8, Absolute Granulocytes 4.5, Absolute Lymphocytes 1.4, 
Absolute Monocytes 0.1  L, Absolute Eosinophils 0.1, Absolute Basophils 0, PUBS 
MCHC 31.7  L
Initial ED EKG: normal axis, normal intervals, normal p-waves, normal QRS 
complex, normal sinus rhythm
 
Departure
 
Departure
Disposition: HOME OR SELF CARE
Condition: Stable
Clinical Impression
Primary Impression: Abdominal pain
Secondary Impressions: Gastroenteritis, Nausea and vomiting
Referrals:
UNKNOWN (PCP/Family)
 
Departure Forms:
Customer Survey
General Discharge Information
Prescriptions:
Current Visit Scripts
Ondansetron (Zofran Odt) 1 TAB SL TID PRN NAUSEA 
     #10 TAB 
 
Omeprazole Magnesium (Prilosec Otc) 1 TAB PO DAILY  
     #30 TAB 
 
 
Comments
pt feeling better after supportive medications, and iv fluids...on exam, no 
tenderness to deep palpation... pt safe for discharge and will follow up with 
pmd.

## 2018-03-13 ENCOUNTER — HOSPITAL ENCOUNTER (INPATIENT)
Dept: HOSPITAL 68 - ERH | Age: 43
LOS: 10 days | DRG: 754 | End: 2018-03-23
Attending: PSYCHIATRY & NEUROLOGY | Admitting: PSYCHIATRY & NEUROLOGY
Payer: COMMERCIAL

## 2018-03-13 VITALS — WEIGHT: 127.37 LBS | BODY MASS INDEX: 21.22 KG/M2 | HEIGHT: 65 IN

## 2018-03-13 DIAGNOSIS — F12.90: ICD-10-CM

## 2018-03-13 DIAGNOSIS — F11.90: ICD-10-CM

## 2018-03-13 DIAGNOSIS — F32.9: Primary | ICD-10-CM

## 2018-03-13 DIAGNOSIS — F15.90: ICD-10-CM

## 2018-03-13 LAB
ABSOLUTE GRANULOCYTE CT: 2.3 /CUMM (ref 1.4–6.5)
BASOPHILS # BLD: 0 /CUMM (ref 0–0.2)
BASOPHILS NFR BLD: 0.2 % (ref 0–2)
EOSINOPHIL # BLD: 0.1 /CUMM (ref 0–0.7)
EOSINOPHIL NFR BLD: 2.4 % (ref 0–5)
ERYTHROCYTE [DISTWIDTH] IN BLOOD BY AUTOMATED COUNT: 19.4 % (ref 11.5–14.5)
GRANULOCYTES NFR BLD: 53.2 % (ref 42.2–75.2)
HCT VFR BLD CALC: 37.3 % (ref 37–47)
LYMPHOCYTES # BLD: 1.4 /CUMM (ref 1.2–3.4)
MCH RBC QN AUTO: 24.5 PG (ref 27–31)
MCHC RBC AUTO-ENTMCNC: 31.3 G/DL (ref 33–37)
MCV RBC AUTO: 78.2 FL (ref 81–99)
MONOCYTES # BLD: 0.4 /CUMM (ref 0.1–0.6)
PLATELET # BLD: 330 /CUMM (ref 130–400)
PMV BLD AUTO: 9.4 FL (ref 7.4–10.4)
RED BLOOD CELL CT: 4.77 /CUMM (ref 4.2–5.4)
WBC # BLD AUTO: 4.3 /CUMM (ref 4.8–10.8)

## 2018-03-13 PROCEDURE — G0480 DRUG TEST DEF 1-7 CLASSES: HCPCS

## 2018-03-13 PROCEDURE — G0463 HOSPITAL OUTPT CLINIC VISIT: HCPCS

## 2018-03-13 NOTE — ED PSYCHIATRIC COMPLAINT
**See Addendum**
History of Present Illness
 
General
Chief Complaint: Psychiatric Related Complaint
Stated Complaint: REQ PSY EVAL, "DOESNT FEEL SAFE AT HOME"
Source: patient, old records
Exam Limitations: no limitations
Allergies
Coded Allergies:
latex (Severe, ITCHY RASH 08/02/17)
lactose (Severe, LACTOSE INTOLERANT 08/02/17)
tramadol (Severe, ITCHING 08/02/17)
 
Reconcile Medications
Escitalopram Oxalate (Lexapro) 10 MG TABLET   3 TAB PO DAILY MENTAL HEALTH  (
Reported)
Hydroxyzine Pamoate (Vistaril) 50 MG CAPSULE   1 CAP PO 4 TIMES/DAY MENTAL 
HEALTH  (Reported)
Lorazepam (Ativan) 2 MG TABLET   1 TAB PO TIDPRN PRN ANXIETY  (Reported)
Methadone HCl 5 MG TABLET   90 MG PO DAILY ADDICTION  (Reported)
Oxycodone HCl 5 MG CAPSULE   1 CAP PO BIDP PRN PAIN  (Reported)
Pantoprazole Sodium (Protonix) 40 MG TABLET.DR   1 TAB PO DAILY GI  (Reported)
Quetiapine Fumarate (Seroquel) 200 MG TABLET   1 TAB PO QPM MENTAL HEALTH  (
Reported)
Trazodone HCl 50 MG TABLET   1 TAB PO QPM SLEEP  (Reported)
 
Triage Note:
PT TO ED STATING "I DONT FEEL SAFE AT HOME."
 STATES IT IIS WHERE SHE IS CURRENTLY STAYING. PT
 VERY SHORT WITH ANSWERS AND NOT LOOKING AT THIS RN
 DURING QUESTIONS. WHEN ASKED IF SHE IS BEING
 HARMED PT RESPONDS "I DONT KNOW WHAT TO SAY ABOUT
 THAT." ASKED IF PT HAS BEEN USING ANY DRUGS OR
 ALCOHOL AND SHE SHAKES HER HEAD YES, ASKED
 SPECIFICALLY WHAT DRUGS AND DID NOT RESPOND. I
 MENTIONED MARIJUANA, COCAINE, AND HEROIN AND PT
 SHOOK HER HEAD YES. PT ALSO STATES THAT SHE HAS
 HAD THOUGHTS ABOUT HARMING HERSELF DUE TO THE
 SITUATION AT HOME, WHICVH SHE STILL DID NOT
 ELABORATE ON. REPORTS SHE HAS NOT GOTTEN THE
 POLICE INVOLVED, STATED "THERE IS NO NEED FOR
 THEM."
Triage Nurses Notes Reviewed? yes
Pregnant: No
Patient currently breastfeeds: No
HPI:
Patient presents for evaluation of "I'm not happy with my life".  Although the 
patient is unable to state the specifics of why she feels this way he states 
yesterday she tried to overdose on a number of different recreational drugs.  
She states she is suffering from chronic pain in her shoulder and currently 
takes methadone.
(Rossana WATTSBarrera)
 
Vital Signs & Intake/Output
Vital Signs & Intake/Output
 Vital Signs
 
 
Date Time Temp Pulse Resp B/P B/P Pulse O2 O2 Flow FiO2
 
     Mean Ox Delivery Rate 
 
03/14 1742 98.8 104 16 126/72  98 Room Air  
 
03/14 1413 97.8 98 20 117/79  98 Room Air  
 
03/14 0919 97.8 87 18 135/87  97 Room Air  
 
03/14 0607 97.2 79 18 108/63  98 Room Air  
 
03/13 1915 97.6 84 18 122/80  99 Room Air  
 
 
 ED Intake and Output
 
 
 03/14 0000 03/13 1200
 
Intake Total  0
 
Output Total  
 
Balance  0
 
   
 
Intake, Oral  0
 
 
 
(Barrera Rebolledo DO)
 
Past History
 
Travel History
Traveled to [place] past 21 day No
 
Medical History
Any Pertinent Medical History? see below for history
Neurological: NONE
EENT: NONE
Cardiovascular: NONE
Respiratory: asthma
Gastrointestinal: peptic ulcer disease, GASTRITIS
Hepatic: NONE
Renal: NONE
Musculoskeletal: sciatica, CARPAL TUNNEL C5/6 HERNIATED DISCS
Psychiatric: anxiety, insomnia, opioid dependence, PANIC ATTACKS
Endocrine: NONE
Blood Disorders: SMV THROMBOSIS "CLOTTING DISORDER"
Cancer(s): NONE
GYN/Reproductive: NONE
Tetanus Vaccine: 01/24/15
 
Surgical History
Surgical History: non-contributory
 
Psychosocial History
Who do you live with Friend
Services at Home None
What is your primary language English
Tobacco Use: Never used
ETOH Use: occasional use
Illicit Drug Use: UTD
 
Family History
Hx Contributory? No
(Barrera Tracy MD)
 
Review of Systems
 
Review of Systems
Constitutional:
Reports: no symptoms. 
EENTM:
Reports: no symptoms. 
Respiratory:
Reports: no symptoms. 
Cardiovascular:
Reports: no symptoms. 
GI:
Reports: no symptoms. 
Genitourinary:
Reports: no symptoms. 
Musculoskeletal:
Reports: see HPI. 
Skin:
Reports: no symptoms. 
Neurological/Psychological:
Reports: see HPI. 
Hematologic/Endocrine:
Reports: no symptoms. 
Immunologic/Allergic:
Reports: no symptoms. 
All Other Systems: Reviewed and Negative
(Barrera Tracy MD)
 
Physical Exam
 
Physical Exam
General Appearance: SEE BELOW
Neurological/Psychiatric: SEE BELOW
Comments:
General: Alert, calm, cooperative
Head: Normocephalic, atraumatic
Eyes: Normal inspection, no nystagmus, EOMI
Ears: Normal inspection
Nose: Normal inspection
Mouth: Poor dentition
Throat: Moist mucosa
Neck: Supple, no goiter
Heart: Regular rate and rhythm, no murmurs rubs or gallops
Lungs: Clear to auscultation bilaterally with good air entry
Abdomen: Soft nontender nondistended, normal bowel sounds
Chest: Nontender
Extremities: Normal range of motion grossly, mild tremors present, no cyanosis 
clubbing or edema of the upper extremities
Neurologic: cranial nerves II through XII grossly intact, speech clear, gait 
normal
Psychiatric: No apparent delusions or hallucinations, no pressured speech or 
thought blocking, appears anxious and a little restless
Lymphatic: No cervical lymphadenopathy
SAD PERSONS Done? DEFERRED TO CRISIS
(Rossana WATTS,Barrera TONEY)
 
Progress
Differential Diagnosis: DEPRESSION, ANXIETY, BIPOLAR DISORDER, PERSONALITY 
DISORDER, DRUG ABUSE, CHRONIC PAIN SYNDROME
Comments:
3/13/2018 10:59:29 AM patient signed out to Dr. Ross at shift change over.
 
3/14/2018 7:12:14 AM patient signed out to me by Dr. Rebolledo at shift change over.
 
3/14/2018 5:53:14 PM patient is being admitted to Inpatient Psychiatry.
(Rossana WATTS,Barrera TONEY)
Hand-Off
   Endorsed To:
Barrera Rebolledo DO
   Endorsed Time: 1900
   Pending: consult (CRISIS EVAL/DISPO)
(Cody WATTS,Tasneem)
Plan of Care:
 Orders
 
 
Procedure Date/time Status
 
Regular Diet 03/15 B Active
 
Regular Diet 03/14 L Complete
 
Admit to inpatient psych 03/14 1741 Active
 
Lab Add-on Test 03/14 1647 Active
 
Lab Add-on Test 03/14 1646 Active
 
Patient Data - inpatient psych 03/14 1637 Active
 
Admit to inpatient psych 03/14 1637 Active
 
Vital Signs 03/14  UNK Active
 
Nursing Misc 03/14  UNK Active
 
CIWA 03/14  UNK Active
 
Alternative Nursing Therapy 03/14  UNK Active
 
Activity/Ambulation 03/14  UNK Active
 
URINE PREGNANCY 03/13 1246 Complete
 
TSH REFLEX 03/13 1245 Active
 
LIPID PANEL 03/13 1245 Active
 
HEPATIC FUNCTION PANEL 03/13 1245 Active
 
GLYCOSYLATED HGB 03/13 1245 Active
 
 
 Current Medications
 
 
  Sig/Colten Start time  Last
 
Medication Dose  Stop Time Status Admin
 
Methadone HCl 5 MG ONCE ONE 03/18 0800 UNVr 
 
(Dolophine)   03/18 0801  
 
Methadone HCl 10 MG ONCE ONE 03/17 0800 UNVr 
 
(Dolophine)   03/17 0801  
 
Methadone HCl 15 MG ONCE ONE 03/16 0800 UNVr 
 
(Dolophine)   03/16 0801  
 
Methadone HCl 20 MG ONCE ONE 03/15 0800 UNVr 
 
(Dolophine)   03/15 0801  
 
Ibuprofen 600 MG ONCE ONE 03/14 1800 UNVr 
 
(Motrin)   03/14 1801  
 
Acetaminophen 650 MG Q6P PRN 03/14 1645 UNVr 
 
(Tylenol)     
 
Al Hydroxide/Mg  30 ML Q4-6 PRN PRN 03/14 1645 UNVr 
 
Hydroxide     
 
(Maalox Plus)     
 
Benztropine Mesylate 1 MG Q6P PRN 03/14 1645 UNVr 
 
(Cogentin 1 MG      
 
Tablet)     
 
Benztropine Mesylate 1 MG Q6P PRN 03/14 1645 UNVr 
 
(Cogentin)     
 
Folic Acid 1 MG DAILY@0800 03/14 1645 UNVr 03/14
 
(Folic Acid)   03/16 0801  1741
 
Gabapentin 300 MG Q6P PRN 03/14 1645 UNVr 
 
(Neurontin)     
 
Haloperidol 5 MG Q6P PRN 03/14 1645 UNVr 
 
(Haldol)     
 
Haloperidol 5 MG Q6P PRN 03/14 1645 UNVr 
 
(Haldol)     
 
Lorazepam 2 MG Q6P PRN 03/14 1645 UNVr 
 
(Ativan)     
 
Lorazepam 2 MG Q2P PRN 03/14 1645 UNVr 
 
(Ativan)     
 
Lorazepam 1 MG Q2P PRN 03/14 1645 UNVr 
 
(Ativan)     
 
Magnesium Hydroxide 30 ML AT BEDTIME PRN 03/14 1645 UNVr 
 
(Milk Of Magnesia)     
 
Multivitamins 1 TAB DAILY@0800 03/14 1645 UNVr 03/14
 
(Theragran Vitamins)     1741
 
Thiamine HCl 100 MG DAILY@0800 03/14 1645 UNVr 03/14
 
(Vitamin B1)   03/16 0801  1741
 
Trazodone HCl 50 MG AT BEDTIME AS NEED.. 03/14 1645 UNVr 
 
(Desyrel)     
 
 
 
 
11:15 AM
PATIENT SIGNED OUT TO ME BY DR TRACY, PENDING CRISIS EVALUATION.
(Cody WATTS,Tasneem)
(Barrera Rebolledo DO)
 
Departure
 
Departure
Disposition: STILL A PATIENT
Condition: Stable
Clinical Impression
Primary Impression: Depression
Referrals:
Patient Has No Primary Care Dr (PCP/Family)
 
Departure Forms:
Customer Survey
General Discharge Information
 
Psych Admission Note
Psychiatric Admission:
I have seen and evaluated MARIO,CHEYENNE L.
 
I have also reviewed all the pertinent lab results and diagnostic results.
 
CHEYENNE MARTIN will be admitted to our inpatient Psychiatric unit for treatment
and care.
 
(Rossana WATTS,Barrera TONEY)
 
Departure
Comments
 
 
 
3/13/18
The patient was signed out to me by Dr. Ross.  She is for bed search by crisis.
 She is sleepy and complains of anxiety.  The patient will be signed out to Dr. Tracy at 7 AM.
(Barrera Rebolledo DO)
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
## 2018-03-14 VITALS — DIASTOLIC BLOOD PRESSURE: 68 MMHG | SYSTOLIC BLOOD PRESSURE: 119 MMHG

## 2018-03-14 VITALS — DIASTOLIC BLOOD PRESSURE: 73 MMHG | SYSTOLIC BLOOD PRESSURE: 120 MMHG

## 2018-03-14 NOTE — IP CRISIS DIAG ASSESS PSYCH
Diagnostic Assessment
 
Basic Assessment
Insurance Authorization:
Insurance #1:
 
Insurance name: SELF-PAY
Phone number: 
Policy number: 
Group number: 
Authorization number: 
 
 
Primary Care Physician:
Patient's PCP: Patient Has No Primary Care Dr
PCP's Phone Number: 
 
Patient's Quote: "I'm not feeling the same way about myself, I dont like myself
".
Patient's Address:
[address] [place] [address]
Home Phone Number: [phone]
Other Phone Number: 
 
Who Do You Live With? Father
Marital Status: 
Primary Language? English
Language(s) Spoken At Home: English
Family/Informants Interviewed: Crisis tried to contact collateral - Shawna Temple (daughter) [phone] & Anotnio Pacheco [phone] but was unable to 
get through.
Allergies -
Coded Allergies:
latex (Severe, ITCHY RASH 17)
lactose (Severe, LACTOSE INTOLERANT 17)
tramadol (Severe, ITCHING 17)
 
Current Medications -
Scheduled Medications
Escitalopram Oxalate (Lexapro) 10 MG TABLET   3 TAB PO DAILY MENTAL HEALTH  (
Reported)
     Entered as Reported by John Zelaya on 17 1400
     Last Taken: 18 
Hydroxyzine Pamoate (Vistaril) 50 MG CAPSULE   1 CAP PO 4 TIMES/DAY MENTAL 
HEALTH  (Reported)
     Entered as Reported by John Zelaya on 17 1400
     Last Taken: 18 
Methadone HCl 5 MG TABLET   90 MG PO DAILY ADDICTION  (Reported)
     Entered as Reported by Sravanthi Steen on 18
     Last Taken: 18 
Pantoprazole Sodium (Protonix) 40 MG TABLET.DR   1 TAB PO DAILY GI  (Reported)
     Entered as Reported by John Zelaya on 17 1400
     Last Taken: 18 
Quetiapine Fumarate (Seroquel) 200 MG TABLET   1 TAB PO QPM MENTAL HEALTH  (
Reported)
     Entered as Reported by John Zelaya on 17 1359
     Last Taken: 18 
Trazodone HCl 50 MG TABLET   1 TAB PO QPM SLEEP  (Reported)
     Entered as Reported by John Zelaya on 17 1400
     Last Taken: 18 
 
Scheduled PRN Medications
Lorazepam (Ativan) 2 MG TABLET   1 TAB PO TIDPRN PRN ANXIETY  (Reported)
     Entered as Reported by Sravanthi Steen on 18
     Last Taken: 18 
Oxycodone HCl 5 MG CAPSULE   1 CAP PO BIDP PRN PAIN  (Reported)
     Entered as Reported by Sravanthi Steen on 18
 
Toxicology Screen Completed? Yes
Results: positive
Symptoms of Use:
cocaine; PCP
 
Past History
 
Past Medical History
Medical History: CARPAL TUNNEL PTSD SMV THROMBOSIS ANXIETY PANIC D.O GASTRITIS 
GASTRIC ULCER INSOMNIA OCD BULGING DISCS CERVICAL GERD
 
Past Surgical History
Surgical History LT ROTATOR CUFF SX TUBAL LIGATION
 
Psychosocial History
Strengths/Capabilities:
Cydney is actively seeking treatement for her substance use and mental
 health.
Physical Limitations (Interventions):
Cydney reports her father does not want her living in the home anylonger.
 
Psychiatric Treatment History
Psych Treatment
   Psychiatric Treatment Yes
   Inpatient Treatment Yes
   Outpatient Treatment Yes
   Location of Treatment [facility] 2017. Pt unable to remember other tx 
facilities.
   Reason for Treatment
depression/anxiety
   Dates of Treatment 2017
   Response to Treatment
Pt reports positive response to in-patient tx
Diagnosis by History:
Depression
 Opiate dependence
Risk Factors: access to lethal means, high anxiety/distress, history of suicide 
atmpts, SA/MH hospitalized, substance abuse, isolate/no social support, limited 
support
 
Substance Use/Abuse History
Drug Use/Abuse minimum 12mo Hx
   Substances Used/Abused Yes
   Substance Used/Abused Other (list in comments) (PCP)
   First Use 42 years old
   Last Used unclear, reports using 3x total
   How much used/taken unclear, reports using 3x total
   How often unclear, reports using 3x total
   For how long less than one year
   Route of use inhalant
 
Substance Abuse Treatment
Substance Abuse Treatment
   Past Substance Abuse [place] Yes
   Inpatient Treatment Yes
   Outpatient Treatment Yes
   Location of Treatment [facility]
   Reason for Treatment
substance use (crack cocaine)
   Dates of Treatment 2017 - [facility] - current
   Response to Treatment
Pt reports doing well at [facility] inpatient
 
Current Mental Status
 
Mental Status
Orientation: Person, Place, Situation
Affect: Depressed, Flat
Speech: Mumbled, Slurred, Soft
Neuro-vegetative: Appetite Decreased, Energy Decreased, Sleep Disturbance
 
Appearance
Appearance- Dress/Hygiene:
Pt presents sleeping in her bed in hospital scrubs. She appers to be
disheveled and unkempt.
 
Behaviors
Thought Process: WNL
Thought Content: Paranoid
Memory: Impaired (difficult to assess )
Insight: Fair
 
SI/HI Risk Assessment
- Minimum 6mo History-
Past Suicidal Ideation/Attempts Yes
Current Suicidal Ideation/Att Yes
Past Homicidal Ideation/Att: No
Current Homicidal Ideation/Attempts No
Degree of Intent: Thoughts/No Intent
Danger To: Self
Gravely Disabled: Poor Impulse Control, Poor Judgment
Risk Factors: access to lethal means, high anxiety/distress, history of suicide 
atmpts, SA/MH hospitalized, substance abuse, isolate/no social support, limited 
support
Lethality Ratin
Needs/Init TX Plan/Goals:
Psychiatric Evaluation
Medication assessment
Individual, Family and Group Meetings
Coordinated Discharge Planning
 
DSM5/PS Stressors/Medical Prob
Diagnosis' (DSM 5, Stressors, Medical):
F32.9 Unspecified Depression
F14.20 Stimulant Use Disorder - Cocaine Type
F10.20 Alcohol Use Disorder
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
F12.20 Cannabis Use Disorder
 
Medical: Acid Reflux, hx of Thrombosis
 
Stressors: housing, finances, family issues
Current GAF: 26
Comments:
n/a

## 2018-03-14 NOTE — ED PSY CRISIS COLLATERAL NOTE
Collateral Note
 
Collateral Note
Family/Inform/Lamin Contacts:
Re eval by LCSW
Sw to pt's room finds her sleeping and only lifting her eyebrows when sw is 
tried to engage her.  Sw explained there were important questions that needed to
be reviewed so we could move crisis eval forward. Pt did not engage beyond some 
movement in her bed.  Pt's breakfast was near her but untouched and sw 
encouraged pt to eat something.  
 
At 11:45, Pt was heard engaging with RN around food.  Pt was being loud, 
demanding and disrespectful to RN.   Pt took breakfast which had been removed 
because it was lunch time and ate it.  Pt then rested a short time and was given
lunch which she eat.  Pt was easier to engage after lunch and reported  she 
might not have active husky but if she calls [employer] she can get it 
reactivated as she has done this before.     Registration ran insurance again 
and finds it inactive.  Pt given number and  ID  and called.  Sw watched pt call
, she  Spoke with Tashi, who reported system down and call again.   He agreed 
pt could get activated
 
 quickly when system is up. 
 Pt reports she remains with S I and fear of using to many drugs therefore feels
she needs to be inpt psych to be safe.  Pt cannot contact for safety.  She 
reports  her father with whom she was staying  will not allow her back until she
receives tx.  Pt has a boyfriend Antonio  who she has been speaking 
with. Sw tried to call  collateral in chart but number were not right.  Pt now 
gives this new number and it goes to Antonio diamond. Message left for him to call. 
 
3: 00pm  Sw and  pt  completed call to  [email or web address] spoke with Babita who
, reports pts last insurance was [id number]  therefore it could take 48 to 72 
hours to become active.  Pt was frustrated by this news but,  was able to stay 
in control.  Pt was frustrate and overwhelmed by DSS's  extensive questions 
which, seemed to be trigger by Pt reporting she planned to make some money  this
year.    Pt reporting she needs her methadone, not having  it is making her 
mcdowell.  Unclear if pt's methadone was verified  at [facility]. Pt continues to report 
SI but, feeling she can  maintain safety while in the hospital.  Pt had her head
 down while on the phone with DSS reporting mood instability, irritable and 
asked  sw  for assistance.   
LUZ Nuñez and Sw collaborating.  Pt reporting to us that  she attends [facility] and her last methadone dose was 95mg.  Call to clinic finds her last 
dosed on March 12 at 30 mg. 
******Sw reviewed  Pt with MD Robert.  MD reviewed  utox and notes he will 
decrease pt's  methadone by 5mg as indicated in our policy for positive urine on
Methadone.  Sw reported to RN that MD had put order in for METHADONE 25mg.  Pt 
informed of dose ordered , the policy and  daily taper scheduled.  Pt was 
agreeable.  
 Collateral : Pt also notified that  sw spoke with her male friend, Jovon.  Pt 
informed  antonio would like to speak with her.  Antonio reported pt has "real bad
Drug problem."  He also noted  he also has one but is trying to be clean and 
helpful to pt and others in need. He noted pt  can get mcdowell when her wishes are
not immediately met or she can  not find money for drugs.  He reports he has 
known pt for a year and she has never tried to hurt herself that he knows of.  
He reports she talks about it when she does not get her way.  She reports she 
will use extra drugs to suicide.  he is aware she has mental health issues also.
 
Pt to be held over for available bed and or reactivated insurance.
Calls to other hospitals for available beds,   seek active insurance.  Pt with a
 sitter.  Pt remains cooperative this afternoon.
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
## 2018-03-15 VITALS — SYSTOLIC BLOOD PRESSURE: 119 MMHG | DIASTOLIC BLOOD PRESSURE: 61 MMHG

## 2018-03-15 VITALS — SYSTOLIC BLOOD PRESSURE: 104 MMHG | DIASTOLIC BLOOD PRESSURE: 62 MMHG

## 2018-03-15 VITALS — DIASTOLIC BLOOD PRESSURE: 61 MMHG | SYSTOLIC BLOOD PRESSURE: 119 MMHG

## 2018-03-15 VITALS — DIASTOLIC BLOOD PRESSURE: 68 MMHG | SYSTOLIC BLOOD PRESSURE: 103 MMHG

## 2018-03-15 VITALS — SYSTOLIC BLOOD PRESSURE: 103 MMHG | DIASTOLIC BLOOD PRESSURE: 68 MMHG

## 2018-03-15 NOTE — SOCIAL WORKER PROG NOTE PSYCH
Social Work Progress Note
Progress Note
11:18am
This writer contacted Mariela at [facility] ([phone]) due to being unable to 
identify the patient's name on the [facility] website.  Mariela stated that all /
policy/last four of social match a last name of "Kwan."
 
4:15pm
This writer met with patient.  Patient appeared very lethargic and had 
difficulty staying awake during this meeting.  She identifed this as a side 
effect of the medications she had been given.  Patient stated that she came to 
the hospital because "I didn't feel safe with myself."  She identified stressors
related to being a mother of six.  She identified drug of choice as "beer, wine 
and Marijuana."  She denied current SI and denied SI/HI/AH/VH.  She agreed to 
inform staff of any concerns or if feeling unsafe.  Patient expressed interest 
in referrals to rehabs and will discuss this furhter with a  
tomorrow as she was too tired today.
 
This writer asked patient if she has another last name other than "Luis."  
Patient identified "Kwan" and stated that the only card listing this name is
her EBT card which she left at a store.  She stated that she will call her 
friend tonight and ask her to pick it up and bring it tomorrow.

## 2018-03-15 NOTE — HISTORY & PHYSICAL
General Information and HPI
MD Statement:
I have seen and personally examined CHEYENNE MARTIN and documented this H&P.
 
The patient is a 42 year old F who presented with a patient stated chief 
complaint of unsafe, depressed.
 
Source of Information: patient
Exam Limitations: no limitations
History of Present Illness:
42-year-old female with past medical history significant for GERD, gastritis, 
SMV thrombosis, hypothyroidism, asthma, depression, anxiety who is admitted to 
Inpatient Psychiatry with feeling unsafe.  Patient reports feeling very 
depressed and anxious.  She also has a history of polysubstance use including 
cocaine, PCP.  She is also everyday alcohol drinker lately has been drinking 
heavy amounts.  She was somewhat confused upon my interview and could not 
provide a detailed history.  She did not remember any of her medications names. 
She does complain of left shoulder pain where she had injury.  She had some 
chills but denies any nausea, vomiting or urinary complaints.  Denies any chest 
pain or shortness of breath, coughing. 
 
Allergies/Medications
Allergies:
Coded Allergies:
latex (Severe, ITCHY RASH 08/02/17)
lactose (Severe, LACTOSE INTOLERANT 08/02/17)
tramadol (Severe, ITCHING 08/02/17)
 
Home Med list
Escitalopram Oxalate (Lexapro) 10 MG TABLET   3 TAB PO DAILY MENTAL HEALTH  (
Reported)
Hydroxyzine Pamoate (Vistaril) 50 MG CAPSULE   1 CAP PO 4 TIMES/DAY MENTAL 
HEALTH  (Reported)
Lorazepam (Ativan) 2 MG TABLET   1 TAB PO TIDPRN PRN ANXIETY  (Reported)
Methadone HCl 5 MG TABLET   90 MG PO DAILY ADDICTION  (Reported)
Oxycodone HCl 5 MG CAPSULE   1 CAP PO BIDP PRN PAIN  (Reported)
Pantoprazole Sodium (Protonix) 40 MG TABLET.DR   1 TAB PO DAILY GI  (Reported)
Quetiapine Fumarate (Seroquel) 200 MG TABLET   1 TAB PO QPM MENTAL HEALTH  (
Reported)
Trazodone HCl 50 MG TABLET   1 TAB PO QPM SLEEP  (Reported)
 
 
Past History
 
Travel History
Traveled to [place] past 21 day No
 
Medical History
Neurological: NONE
EENT: NONE
Cardiovascular: NONE
Respiratory: asthma
Gastrointestinal: peptic ulcer disease, GASTRITIS
Hepatic: NONE
Renal: NONE
Musculoskeletal: sciatica, CARPAL TUNNEL C5/6 HERNIATED DISCS
Psychiatric: anxiety, insomnia, opioid dependence, PANIC ATTACKS
Endocrine: NONE
Blood Disorders: SMV THROMBOSIS "CLOTTING DISORDER"
Cancer(s): NONE
GYN/Reproductive: NONE
Isolation History: Standard
Tetanus Vaccine: 01/24/15
 
Surgical History
Surgical History: non-contributory
 
Past Family/Social History
 
Family History
Relations & Conditions if any
FATHER
Relation not specified for:
  FH: CAD (coronary artery disease)
 
 
Psychosocial History
Services at Home: None
ETOH Use: occasional use
Illicit Drug Use: UTD
 
Review of Systems
 
Review of Systems
Constitutional:
Reports: see HPI. 
EENTM:
Reports: see HPI. 
Cardiovascular:
Reports: see HPI. 
Respiratory:
Reports: see HPI. 
GI:
Reports: see HPI. 
Musculoskeletal:
Reports: see HPI. 
Skin:
Reports: see HPI. 
Neurological/Psychological:
Reports: see HPI. 
 
Exam & Diagnostic Data
Last 24 Hrs of Vital Signs/I&O
 Vital Signs
 
 
Date Time Temp Pulse Resp B/P B/P Pulse O2 O2 Flow FiO2
 
     Mean Ox Delivery Rate 
 
03/14 2333 99.2 92 20 120/73     
 
03/14 1929 98.2 98  119/68     
 
03/14 1929 98.2 98  119/68     
 
03/14 1853 98.5 97 20 128/71  98 Room Air  
 
03/14 1742 98.8 104 16 126/72  98 Room Air  
 
03/14 1413 97.8 98 20 117/79  98 Room Air  
 
 
 Intake & Output
 
 
 03/15 1600 03/15 0800 03/15 0000
 
Intake Total   
 
Output Total   
 
Balance   
 
    
 
Patient   127 lb
 
Weight   
 
 
 
 
Physical Exam
General Appearance Alert, Oriented X3, Cooperative, No Acute Distress
Skin No Rashes
HEENT PERRLA
Neck Supple
Cardiovascular Regular Rate, Normal S1, Normal S2
Lungs Clear to Auscultation
Abdomen Normal Bowel Sounds, Soft, No Tenderness
Neurological
   Cranial Nerves II through XII:
Intact
Extremities No Edema
Last 24 Hrs of Labs/Bk:
 Laboratory Tests
 
 
 03/13 03/13
 
 1246 1245
 
Chemistry  
 
  Sodium (137 - 145 mmol/L)  144
 
  Potassium (3.5 - 5.1 mmol/L)  4.3
 
  Chloride (98 - 107 mmol/L)  102
 
  Carbon Dioxide (22 - 30 mmol/L)  33  H
 
  Anion Gap (5 - 16)  9
 
  BUN (7 - 17 mg/dL)  15
 
  Creatinine (0.5 - 1.0 mg/dL)  0.7
 
  Estimated GFR (>60 ml/min)  > 60
 
  BUN/Creatinine Ratio (7 - 25 %)  21.4
 
  Glucose (65 - 99 mg/dL)  77
 
  Hemoglobin A1c (4.2 - 5.8 %)  5.7
 
  Calcium (8.4 - 10.2 mg/dL)  9.8
 
  Total Bilirubin (0.2 - 1.3 mg/dL)  0.5
 
  Direct Bilirubin (< 0.4 mg/dL)  0.5  H
 
  AST (14 - 36 U/L)  21
 
  ALT (9 - 52 U/L)  25
 
  Alkaline Phosphatase (<127 U/L)  62
 
  Total Protein (6.3 - 8.2 g/dL)  7.0
 
  Albumin (3.5 - 5.0 g/dL)  3.9
 
  Triglycerides (<150 mg/dL)  102
 
  Cholesterol (<200 MG/DL)  137
 
  LDL Cholesterol, Calc (65 - 129 mg/dL)  66
 
  HDL Cholesterol (40 - 60 mg/dL)  51
 
  Cholesterol/HDL Ratio (0.00 - 4.23 %)  3
 
  Free T4 (0.64 - 1.79 ng/dL)  1.37
 
  Total T3 (0.97 - 1.69 ng/mL)  1.30
 
  TSH &T3 &Free T4 Intrp (0.270 - 4.20 uIU/mL)  0.230  L
 
Hematology  
 
  CBC w Diff  NO MAN DIFF REQ
 
  WBC (4.8 - 10.8 /CUMM)  4.3  L
 
  RBC (4.20 - 5.40 /CUMM)  4.77
 
  Hgb (12.0 - 16.0 G/DL)  11.7  L
 
  Hct (37 - 47 %)  37.3
 
  MCV (81.0 - 99.0 FL)  78.2  L
 
  MCH (27.0 - 31.0 PG)  24.5  L
 
  MCHC (33.0 - 37.0 G/DL)  31.3  L
 
  RDW (11.5 - 14.5 %)  19.4  H
 
  Plt Count (130 - 400 /CUMM)  330
 
  MPV (7.4 - 10.4 FL)  9.4
 
  Gran % (42.2 - 75.2 %)  53.2
 
  Lymphocytes % (20.5 - 51.1 %)  34.0
 
  Monocytes % (1.7 - 9.3 %)  10.2  H
 
  Eosinophils % (0 - 5 %)  2.4
 
  Basophils % (0.0 - 2.0 %)  0.2
 
  Absolute Granulocytes (1.4 - 6.5 /CUMM)  2.3
 
  Absolute Lymphocytes (1.2 - 3.4 /CUMM)  1.4
 
  Absolute Monocytes (0.10 - 0.60 /CUMM)  0.4
 
  Absolute Eosinophils (0.0 - 0.7 /CUMM)  0.1
 
  Absolute Basophils (0.0 - 0.2 /CUMM)  0
 
Toxicology  
 
  Methadone Screen (>300 NG/ML) > 735  H 
 
  Barbiturate Screen (>200 NG/ML) 65 
 
  Ur Phencyclidine Scrn (>25 NG/ML) > 72.00  H 
 
  Amphetamines Screen (>1000 NG/ML) < 100 
 
  U Benzodiazepines Scrn (>200 NG/ML) < 85 
 
  Urine Cocaine Screen (>300 NG/ML) > 1000  H 
 
  Urine Cannabis Screen (>50 NG/ML) < 5.00 
 
  Serum Alcohol (<10 MG/DL)  < 10.0
 
Urines  
 
  Urine Pregnancy Test NEGATIVE 
 
 
 
 
Assessment/Plan
Assessment:
42-year-old female with past medical history significant for GERD, gastritis, 
SMV thrombosis, hypothyroidism, asthma, depression, anxiety admitted to 
Inpatient Psychiatry with worsening depression, suicidal ideation but no plan 
and worsening and ID.
Patient has this documented history of SMV thrombosis but I do not see that she 
is on any blood thinners.  She does not remember anything about her medications.
 Patient's claim history does not mention anything about the blood thinner.  I 
have told nursing staff at Inpatient Psychiatry to confirm her medication list.
Her thyroid function shows she has low TSH but her T4 and T3 are all within 
normal limits.  I do not see any levothyroxine and her medication list.  The 
fact that her T4 and T3 are normal, I will not start her on any levothyroxine at
present.  She can follow-up with her primary care doctor in future for that.
Patient is ordered albuterol inhaler as needed.  She has history of asthma but 
currently not in acute exacerbation.  Patient is also on multivitamin, folate, 
thiamine and Ativan for history of alcohol use.  For the psych management will 
be up to psychiatry for her psych issues
 
As Ranked By This Provider
Problem List:
 1. Anxiety
 
 2. Drug abuse
 
 3. LT SHOULDER SUGERY
 
 4. Suicidal ideation
 
 5. Toothache
 
 6. Gastritis
 
 7. Depression
 
 
Miscellaneous
 
Miscellaneous Documentation
Attending Case Discussed With:
Corina Nicole M.D.
 
Primary Care Physician:
Patient Has No Primary Care Dr
 
Patient sees these Specialists
psychiatry
Level of Patient Care: [facility]
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
## 2018-03-16 VITALS — SYSTOLIC BLOOD PRESSURE: 124 MMHG | DIASTOLIC BLOOD PRESSURE: 74 MMHG

## 2018-03-16 VITALS — DIASTOLIC BLOOD PRESSURE: 80 MMHG | SYSTOLIC BLOOD PRESSURE: 135 MMHG

## 2018-03-16 VITALS — SYSTOLIC BLOOD PRESSURE: 135 MMHG | DIASTOLIC BLOOD PRESSURE: 80 MMHG

## 2018-03-16 VITALS — SYSTOLIC BLOOD PRESSURE: 128 MMHG | DIASTOLIC BLOOD PRESSURE: 73 MMHG

## 2018-03-16 NOTE — SOCIAL WORKER PROG NOTE PSYCH
Social Work Progress Note
Progress Note
I am covering for Jane Walker LCSW today. Met with Cydney this afternoon, 
she was in the kitchen coloring.  She was pleasant, cooperative - seemed to be 
less sedated than yesterday.  She reported some SI feeling that she "doesn't 
like her life", and stated, "I don't like my life. I have no friends... don't 
like who I am. I have no job and I have made stupid choices."  She stated she 
feels if she leaves here she will relapse again. She is homeless, has no place 
to live. She has been staying with her daughter Mesha - but cannot stay with 
her because she has [address].  She has a boyfriend of the past year - Antonio Pacheco Ph#[phone], who she agreed to have come in for a family meeting.  
 
She has been receiving Methadone at [facility] for the past 3yrs - her 
counselor Lashell. She is not identifing any triggers to relapse.  She has been
using crack cocaine $300/day 7-8 grams starting April 2017.  She has been 
drinking alcohol - 4 pack of wine coolers, and (2) pints Cristiane's.   She rates 
anxiety 7/10 (worst) and depression 10/10.  She reports 2 suicide attempts but 
can't remember details. 
 
She stated she signed herself out of [facility] in Oct. 2017 - reports she was 
there for 1 month due to SI and drug use.  She wishes she didn't sign her self 
out AMA. She relapsed right after discharge.   She reports longest period of 
sobriety is 1 year - relapsed April 2017.  She stated she stayed clean by 
attending groups at [facility].  She has only 2 rehabs - [facility] 
2yrs ago, [facility] many years ago.  She is interested in a rehab.  
 
She signed ROSLYN's for rehabs - [facility], [facility], [facility].  Need to 
fax clinical to rehabs and referral to [facility].

## 2018-03-16 NOTE — CP SOUTH PROGRESS NOTE PSYCH
Psych (Inpt) Progress Note
Progress Note
Include the following elements, when applicable:
Involvement in the active treatment of the patient with behavioral observations 
of the patient and the patient's response to the treatment.
Review of the ongoing treatment process in the context of the treatment plan.
Indication of how multi-disciplinary staff members are carrying out the 
treatment plan.
Plans for future interventions and recommendations for revision of the treatment
plan.
Liaison with other physicians/providers.
Progress Note:
I discussed this patient's progress to date, current mental status, treatment 
process in the context of the treatment plan, and discharge planning with staff/
team in the daily morning inpatient team meeting. I also met with the patient  
myself in individual session.
 
A total of 25 minutes was spent with the patient with more than 50% spent in 
counseling and/or coordination of care.
 
SUBJECTIVE: "I feel real bad.  I hate looking upon my life and seeing what it 
is.  The worst thing is that I did it to myself."
 
OBJECTIVE:
 Current Medications
 
 
  Sig/Colten Start time  Last
 
Medication Dose Route Stop Time Status Admin
 
Acetaminophen 650 MG Q6P PRN 03/14 1645 AC 03/15
 
  PO   1122
 
Al Hydroxide/Mg  30 ML Q4-6 PRN PRN 03/14 1645 AC 
 
Hydroxide  PO   
 
Albuterol Sulfate 2 PUF Q4 PRN 03/14 2330 AC 03/15
 
  INH   1124
 
Benzocaine 1 YOLETTE Q6-PRN PRN 03/14 2245 AC 03/16
 
  TOP   0655
 
Benztropine Mesylate 1 MG Q6P PRN 03/14 1645 AC 
 
  PO   
 
Benztropine Mesylate 1 MG Q6P PRN 03/14 1645 AC 
 
  IM   
 
Diphenhydramine HCl 50 MG Q6P PRN 03/14 2300 DC 03/14
 
  PO   2331
 
Escitalopram Oxalate 20 MG 0800 03/17 0800 AC 
 
  PO   
 
Escitalopram Oxalate 10 MG ONCE ONE 03/16 1445 DC 
 
  PO 03/16 1446  
 
Escitalopram Oxalate 10 MG DAILY 03/15 1000 AC 03/16
 
  PO   1110
 
Folic Acid 1 MG DAILY@0800 03/14 1645 DC 03/16
 
  PO 03/16 0801  1111
 
Gabapentin 400 MG Q6-PRN PRN 03/16 1445 AC 03/16
 
  PO   1442
 
Gabapentin 300 MG Q6P PRN 03/14 1645 DC 03/16
 
  PO   0626
 
Haloperidol 5 MG Q6P PRN 03/14 2300 AC 03/14
 
  PO   2330
 
Haloperidol 5 MG Q6P PRN 03/14 1645 AC 
 
  IM   
 
Hydroxyzine HCl 50 MG Q6-PRN PRN 03/15 1645 AC 03/16
 
  PO   1116
 
Hydroxyzine HCl 50 MG FOUR TIMES A DAY 03/15 1000 DC 03/15
 
  PO   1333
 
Ibuprofen 800 MG Q6P PRN 03/15 1230 AC 03/16
 
  PO   0625
 
Lorazepam 2 MG Q6P PRN 03/14 2300 DC 03/14
 
  PO   2329
 
Lorazepam 2 MG Q6P PRN 03/14 1645 AC 
 
  IM   
 
Lorazepam 2 MG Q2P PRN 03/14 1645 AC 
 
  PO   
 
Lorazepam 1 MG Q2P PRN 03/14 1645 AC 
 
  PO   
 
Magnesium Hydroxide 30 ML AT BEDTIME PRN 03/14 1645 AC 
 
  PO   
 
Methadone HCl 5 MG ONCE ONE 03/18 0800 AC 
 
  PO 03/18 0801  
 
Methadone HCl 10 MG ONCE ONE 03/17 0800 AC 
 
  PO 03/17 0801  
 
Methadone HCl 15 MG ONCE ONE 03/16 0800 DC 03/16
 
  PO 03/16 0801  1112
 
Multivitamins 1 TAB DAILY@0800 03/14 1645 AC 03/16
 
  PO   1111
 
Nicotine 14 MG DAILY 03/15 1649 AC 03/16
 
  TOP   1110
 
Omeprazole 40 MG DAILY AC 03/15 0700 AC 03/16
 
  PO   0604
 
Quetiapine Fumarate 200 MG 2200 03/15 2200 AC 03/15
 
  PO   2116
 
Thiamine HCl 100 MG DAILY@0800 03/14 1645 DC 03/16
 
  PO 03/16 0801  1111
 
Trazodone HCl 50 MG AT BEDTIME 03/16 2200 AC 
 
  PO   
 
Trazodone HCl 50 MG AT BEDTIME AS NEED.. 03/14 2300 DC 
 
  PO   
 
 
Vital Signs
 
 
Date Time Temp Pulse Resp B/P B/P Pulse O2 O2 Flow FiO2
 
     Mean Ox Delivery Rate 
 
03/16 1221  100  128/73     
 
03/15 2023 98.2 86  103/68     
 
03/15 2022 98.2 86  103/68     
 
03/15 1559  88  119/61     
 
03/15 1555  88  119/61     
 
 
 
ASSESSMENT:
Patient presents today calm and cooperative.  Although she had appeared very 
fatigued earlier this morning, by the time I visited with her at approximately 2
PM today, she was awake and alert.  She reports high levels of depression and 
anxiety.  She reports a history of suicidal attempts in the past, however states
that "I feel too safe here."  She denies a plan or intent to harm herself at 
this time.  She would like to go to a residential rehab.
 
At the time of this writing at almost 4:00 in the afternoon, the patient is 
apparently happily engaged in a recreational organized activity on the unit, 
laughing and apparently enjoying herself.
 
Denies homicidal ideation, auditory hallucinations, paranoid ideation.
She reports seeing "shadows."  States "my mind is so polluted."
 
Speech is well articulated, goal-directed, average in rate, volume and tone.
 
The patient understands the risks/benefits/side effects of the medication and is
agreeable to continue taking them.
 
PLAN: Slow progress.
1.  Increase Lexapro to 20 mg daily for continued depression and anxiety.
2.  Increase gabapentin as needed to 400 mg for continuing breakthrough anxiety.
3.  Patient has requested additional lorazepam.  I explained to her that there 
was CIWA triggered Lorazepam ordered, and that she would receive Lorazepam if 
indicated.  She expressed her unhappiness with this decision.
4.  Continue methadone taper.
5.  Consider disposition to residential rehab, if possible to obtain a bed.
Continue with other current management as patient is improving.
Continue to provide support and encouragement.

## 2018-03-17 VITALS — SYSTOLIC BLOOD PRESSURE: 140 MMHG | DIASTOLIC BLOOD PRESSURE: 72 MMHG

## 2018-03-17 VITALS — DIASTOLIC BLOOD PRESSURE: 71 MMHG | SYSTOLIC BLOOD PRESSURE: 122 MMHG

## 2018-03-17 VITALS — SYSTOLIC BLOOD PRESSURE: 126 MMHG | DIASTOLIC BLOOD PRESSURE: 66 MMHG

## 2018-03-17 VITALS — SYSTOLIC BLOOD PRESSURE: 114 MMHG | DIASTOLIC BLOOD PRESSURE: 53 MMHG

## 2018-03-17 VITALS — DIASTOLIC BLOOD PRESSURE: 53 MMHG | SYSTOLIC BLOOD PRESSURE: 114 MMHG

## 2018-03-17 VITALS — SYSTOLIC BLOOD PRESSURE: 122 MMHG | DIASTOLIC BLOOD PRESSURE: 71 MMHG
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
## 2018-03-18 VITALS — DIASTOLIC BLOOD PRESSURE: 56 MMHG | SYSTOLIC BLOOD PRESSURE: 115 MMHG

## 2018-03-18 VITALS — DIASTOLIC BLOOD PRESSURE: 64 MMHG | SYSTOLIC BLOOD PRESSURE: 125 MMHG

## 2018-03-18 VITALS — SYSTOLIC BLOOD PRESSURE: 108 MMHG | DIASTOLIC BLOOD PRESSURE: 51 MMHG

## 2018-03-18 VITALS — DIASTOLIC BLOOD PRESSURE: 66 MMHG | SYSTOLIC BLOOD PRESSURE: 126 MMHG

## 2018-03-18 NOTE — CP SOUTH PROGRESS NOTE PSYCH
Psych (Inpt) Progress Note
Progress Note
Include the following elements, when applicable:
Involvement in the active treatment of the patient with behavioral observations 
of the patient and the patient's response to the treatment.
Review of the ongoing treatment process in the context of the treatment plan.
Indication of how multi-disciplinary staff members are carrying out the 
treatment plan.
Plans for future interventions and recommendations for revision of the treatment
plan.
Liaison with other physicians/providers.
Progress Note:
 
Stated that she feels depressed, with occasional SI but no plan and not 
overwhelming. Had a number of complaints  congestion, feeling hot, diarrhea, 
pain from her tooth, and not having seroquel in day time. Explained that many of
her sx are due to withdrawal and hopefully should improve in the next few days. 
She wants oxycodone when she is tapered off her methadone but I attremped to 
explain that this would likely be unrealistic and that a pain clinic could 
unlikely do this for her. She stated that she is trying to get better and move 
forward. Has been speaking with her boyfriend and father, and has had no 
visitors recently. 
MSE: slim woman, younger appearing than stated age. Fair to poor grooming. 
Anxious, somewhat sweaty, uncomfortable appearing at times, but in the milieu 
appeared relaxed and engaged with peers. Speech is normal. Her mood is down and 
affect is congruent but reactive. She admits to occasional si which are passing.
Her thinking is coherent overall. There are no gross delusions elicited, no 
hallucinations.  Her judgment is fair and insight is fair.
A: 42 year old woman w/ hx depressive sx and significant drug use d/o. She 
appears future oriented and motivated to remain sober despite her sedation and 
depressed affect.  Risk factors are withdrawal sx, drug use and suicidal 
thoughts in addition to past attempts, in addition to depression. Today she 
appears more alert and experiencing some minor withdrawal sx, which she prefers 
to medicate and sleep away. 
 
Plan: Address dynamic risk factors w/ med adjustments, education. Changed 
seroquel to 100mg at 4pm and 100mg at night time, she stated she always takes it
in the day time but agreed to this change for now. She wanted haldol for anxiety
/agitation and I explained to her that it was similar to seroquel but she stated
it was helpful when she took it last, will maintain as less frequent dosing. 
Will add in nasonex for congestion which is notable. She has an unrealistic 
expectation about being put back on her pain meds which I attempted to explain 
to her, will need ongoing education.

## 2018-03-19 VITALS — SYSTOLIC BLOOD PRESSURE: 112 MMHG | DIASTOLIC BLOOD PRESSURE: 64 MMHG

## 2018-03-19 VITALS — DIASTOLIC BLOOD PRESSURE: 73 MMHG | SYSTOLIC BLOOD PRESSURE: 110 MMHG

## 2018-03-19 VITALS — SYSTOLIC BLOOD PRESSURE: 110 MMHG | DIASTOLIC BLOOD PRESSURE: 73 MMHG

## 2018-03-19 VITALS — DIASTOLIC BLOOD PRESSURE: 79 MMHG | SYSTOLIC BLOOD PRESSURE: 122 MMHG

## 2018-03-19 VITALS — SYSTOLIC BLOOD PRESSURE: 111 MMHG | DIASTOLIC BLOOD PRESSURE: 67 MMHG

## 2018-03-19 VITALS — DIASTOLIC BLOOD PRESSURE: 64 MMHG | SYSTOLIC BLOOD PRESSURE: 112 MMHG

## 2018-03-19 VITALS — DIASTOLIC BLOOD PRESSURE: 67 MMHG | SYSTOLIC BLOOD PRESSURE: 111 MMHG

## 2018-03-19 NOTE — CP SOUTH PROGRESS NOTE PSYCH
Psych (Inpt) Progress Note
Progress Note
Vital Signs
Vital Signs
 
 
Date Time Temp Pulse Resp B/P B/P Pulse O2 O2 Flow FiO2
 
     Mean Ox Delivery Rate 
 
03/19 1557  96  112/64     
 
03/19 1227  99  110/73     
 
03/19 1222  99  110/73     
 
 
Mental Status Update:
alert, reported some withdrawal sx, 
less depressed, denied thoughts of suicide
c/o shoulder pain
Speech is normal. 
affect is congruent 
thinking is coherent overall. There are no gross delusions elicited, no 
hallucinations. 
 
A: 
A 42-year-old woman w/ hx depressive sx and significant drug use d/o.
 
Plan:
One dose of methadone 5mg because of mild reidual withdrawal
Voltaren PRN for shoulder pain
 
Haloperidol 5 MG Q6P PRN 03/14 2300 DC 03/18
 
  PO   1441
 
Haloperidol 5 MG Q6P PRN 03/14 1645 AC 
 
Hydroxyzine HCl 50 MG Q6-PRN PRN 03/15 1645 AC 03/18
 
  PO   1442
 
Ibuprofen 800 MG .STK-MED ONE 03/18 0834 DC 
 
  PO 03/18 0835  
 
Ibuprofen 800 MG Q6P PRN 03/15 1230 AC 03/18
 
  PO   2226
 
Lorazepam 2 MG Q6P PRN 03/14 1645 AC 
 
  IM   
 
Lorazepam 2 MG Q2P PRN 03/14 1645 AC 
 
Lorazepam 1 MG Q2P PRN 03/14 1645 AC 
 
  PO   
 
Magnesium Hydroxide 30 ML AT BEDTIME PRN 03/14 1645 AC 
 
Multivitamins 1 TAB DAILY@0800 03/14 1645 AC 03/18
 
  PO   0831
 
Nicotine 14 MG DAILY 03/15 1649 AC 03/18
 
Omeprazole 40 MG DAILY AC 03/15 0700 AC 03/19
 
  PO   0620
 
Quetiapine Fumarate 100 MG AT BEDTIME 03/18 2200 AC 03/18
 
Quetiapine Fumarate 100 MG 1600 03/18 1600 AC 03/18
 
Quetiapine Fumarate 200 MG 2200 03/15 2200 DC 03/17
 
  PO   2136
 
Sodium Chloride 2 SPRAY Q6P PRN 03/18 1445 AC 03/18
 
  LILO   1625
 
Trazodone HCl 50 MG AT BEDTIME 03/16 2200 AC 03/18
 
  PO   2222
 
 
Vital Signs
 
 
Date Time Temp Pulse Resp B/P B/P Pulse O2 O2 Flow FiO2
 
     Mean Ox Delivery Rate 
 
03/18 1931 98.9 84  126/66     
 
03/18 1931 98.9 84  126/66     
 
03/18 1534  96  108/51     
 
03/18 1534  100  108/51     
 
03/18 1212  92  115/56     
 
03/18 1211  92  115/56     
 
03/18 0831 98.3 87  125/64     
 
03/18 0828 98.3 87  125/64

## 2018-03-19 NOTE — SOCIAL WORKER PROG NOTE PSYCH
Social Work Progress Note
Progress Note
2:10pm
This writer met with patient.  Patient stated that she spoke with her friend 
about her EBT card (card with patient's other last name listed on it, "Kwan
"), however, he friend did not bring her the card.  Patient described her mood 
as "not good" and experiencing decreased energy which she believes may be due to
the decreased dose of Methadone.  Patient expressed interest in entering rehab/
inpatient treatment.  She was agreeable to attending an IOP if inpatient is not 
immediately avaialable upon discharge.  Patient denied SI/HI/AH/VH.

## 2018-03-19 NOTE — SOCIAL WORKER PROG NOTE PSYCH
Social Work Progress Note
Progress Note
[employer] auth entered under patient's last name "Kwan":
 
Member Name 
Member ID 
Member  
Subscriber Name 
Subscriber ID 
CHEYENNE GIANG 
[id number] 
1975 
CHEYENNE GIANG 
[id number] 
Pended Authorization # 
Client Authorization # 
Type of Request 
[id number] 
[id number] 
CONCURRENT 
Date of Admission/ Start of Services 
Requested From 
Submission Date 
2018 
Level of Service 
Type of Service 
Level of Care 
Type of Care 
INPATIENT/OC 
Mental Health 
Inpatient 
[facility]patient [facility] - [facility]patient [facility]

## 2018-03-20 VITALS — SYSTOLIC BLOOD PRESSURE: 124 MMHG | DIASTOLIC BLOOD PRESSURE: 64 MMHG

## 2018-03-20 VITALS — SYSTOLIC BLOOD PRESSURE: 132 MMHG | DIASTOLIC BLOOD PRESSURE: 72 MMHG

## 2018-03-20 VITALS — DIASTOLIC BLOOD PRESSURE: 64 MMHG | SYSTOLIC BLOOD PRESSURE: 114 MMHG

## 2018-03-20 VITALS — SYSTOLIC BLOOD PRESSURE: 132 MMHG | DIASTOLIC BLOOD PRESSURE: 75 MMHG

## 2018-03-20 VITALS — DIASTOLIC BLOOD PRESSURE: 73 MMHG | SYSTOLIC BLOOD PRESSURE: 128 MMHG

## 2018-03-20 NOTE — CP SOUTH PROGRESS NOTE PSYCH
Psych (Inpt) Progress Note
Progress Note
Vital Signs
 
 
Date Time Temp Pulse Resp B/P B/P Pulse O2 O2 Flow FiO2
 
     Mean Ox Delivery Rate 
 
03/20 1232  96  114/64     
 
03/20 1230  96  114/64     
 
03/19 2011 99.3 96  111/67     
 
03/19 2010 99.3 96  111/67     
 
03/19 1557  96  112/64     
 
 
 
Mental Status Update:
The patient was alert, and oriented to time place and person.  She continues to 
report mild withdrawal symptoms.  She reported that she is still feeling 
depressed but with relative improvement compared to the previous days.  She 
denied thoughts of suicide
She did not bring up any complaints of pain today 
I had a speech was normal/not pressured, not slurred
affect is congruent , her thinking was coherent
She denied hallucinations.  There were no delusions
 
A: 
A 42-year-old single Black female who was admitted to the inpatient psychiatric 
unit at [facility] with depressive sx and significant drug use d/o.
She may be homeless at the present time and she says that she is interested in 
going to a residential rehabilitation place.
 
Plan:
Repeat one dose of methadone 5mg because of mild reidual withdrawal

## 2018-03-20 NOTE — SOCIAL WORKER PROG NOTE PSYCH
Social Work Progress Note
Progress Note
11:40am
This writer met with patient.  She described her mood as "alright".  She stated 
that she is unable to stay with her boyfriend due to the fact that he is already
living with family members.  Patient stated that she had been staying with her 
daughter, Shelby Taylor, prior to this admission and plans to return there upon 
discharge.  This writer and patient discussed scheduling a family meeting.  She 
would like to invite her boyfriend, her daughter and her father.  All three were
called.  Her boyfriend stated that he would be available tomorrow as well as her
father.  Her daughter became upset when she learned that the patient's boyfriend
would be attending.  Patient decided that she did not want her daughter to 
attend.  The family meeting is scheduled for 10:30am, which her father was 
informed of.  Patient stated that she will contact her boyfriend to inform of 
the time and her daughter to inform that a separate meeting will take place 
between the daughter, patient, this writer and psychiatrist.
Patient had been provided with the phone numbers for [facility] and [facility].  She stated that she called these numbers and left voicemails.  Due to 
patient's two last names, "Kwan" and "Luis," patient signed ROSLYN's for the 
following programs with her name listed as Cydney Smith ([place]) (as 
discussed with JORGE Garcia LCSW):
- [facility]
- [facility]
- [facility]
- [facility]
- [facility]
- [facility]
- ROSLYN's were also signed for Antonio Pacheco (boyfriend), Steve Taylor (daughter) 
and Berry Temple Sr. (father).
 
Patient stated that if she is unable to enter rehab immediately upon discharge, 
she would go to the [facility] at the [facility] until a bed is available.

## 2018-03-21 VITALS — DIASTOLIC BLOOD PRESSURE: 63 MMHG | SYSTOLIC BLOOD PRESSURE: 111 MMHG

## 2018-03-21 VITALS — DIASTOLIC BLOOD PRESSURE: 69 MMHG | SYSTOLIC BLOOD PRESSURE: 110 MMHG

## 2018-03-21 VITALS — SYSTOLIC BLOOD PRESSURE: 131 MMHG | DIASTOLIC BLOOD PRESSURE: 68 MMHG

## 2018-03-21 NOTE — SOCIAL WORKER PROG NOTE PSYCH
**See Addendum**
Social Work Progress Note
Progress Note
1:35pm
This writer met with patient.  She stated that she spoke with Rose Reagan) 
earlier today regarding referrals to inpatient programs.  Patient complained of 
left shoulder pain which she believes has increased due to the decrease in the 
Methadone dose.  Patient stated that she continues to call [facility] and 
[facility] and has been unable to reach them.  She plans to continue to call 
them this afternoon and evening.  Patient denied SI/HI/AH/VH.

## 2018-03-21 NOTE — SOCIAL WORKER PROG NOTE PSYCH
Social Work Progress Note
Progress Note
Cydney called [employer] (with my assitance) - Spoke with [employer] [facility] 
rep. apparently Cydney DID call to apply on 3/14/18(while in ED), application 
ID#[id number].  She is eligible - uncertain when will be active.
 Her TEMP ID is [id number].  
Will be retro active 3/1/18. 
 
Cydney stated she called [facility] and [facility] yesterday 3/20/18 and left a vm.  
Informed her she needs to call and speak with [facility] for a screening.  
Advised her to call today. Asked [facility] to have PPD ordered/placed.  Dr. Gharaibeh will order. 
 
Faxed clinical to following 3/21/18: 
[facility] - Fax confirmations in chart (back)
[facility] - Faxed - waiting for confirmation (RENÉ AWARE)
[facility] - Fax confirmation received 3/21/18 12:21pm 
[facility] - waiting for confirmation (RENÉ AWARE)
[facility] - waiting for confirmation (R[facility] AWARE)

## 2018-03-21 NOTE — CP SOUTH PROGRESS NOTE PSYCH
Psych (Inpt) Progress Note
Progress Note
Vital Signs
 
 
Date Time Temp Pulse B/P Pulse O2 FiO2
 
03/21 0745 97.2 81 131/68   
 
03/20 2306  91 128/73   
 
03/20 1943 98.5 92 124/64   
 
 
The patient's progress and treatment plan were reviewed in the multidisciplinary
treatment team meeting.  The disciplines involved in the team meeting: RN, LCSW,
group and activity therapists, and psychiatrist
 
Mental Status Examination:
The patient was alert, and oriented to time place and person.  She denied 
withdrawal symptoms this morning.  She reported that her mood remains down, 
depressed, but denied feeling hopeless or worthless, denied wishing death, and 
denied thoughts of suicide. Her speech was normal/not pressured, not slurred
Her affect was of good range, and congruent. She was coherent. She denied 
hallucinations.  There were no delusions.  She denied thoughts of violence or 
homicide
 
Assessment: 
Cydney is a 42-year-old single Black female who was admitted to the inpatient 
psychiatric unit at [facility] on 3/14/2018 with feelings of depression (
10/10), anxiety (10/10), feeling hopeless and suicidal, but not having a plan. 
She reported trouble sleeping, poor concentration and poor appetite. Pt reports 
at least 2 prior suicide attempts (? reliability of this report). The 
precipitant appeared to be homelessness and drug withdrawals. Today, Cydney 
was agreeable to see how she does without the 5 mg of methadone she got the past
couple of days
She asked for an increase in Seroquel because she feels (and seemed) down 
 
Treatment Plan Update:
Increase Seroquel to 50 mg in AM, 100 mg at 4PM and 100 mg at bedtime
Continue same other medications

## 2018-03-22 VITALS — SYSTOLIC BLOOD PRESSURE: 97 MMHG | DIASTOLIC BLOOD PRESSURE: 53 MMHG

## 2018-03-22 VITALS — SYSTOLIC BLOOD PRESSURE: 103 MMHG | DIASTOLIC BLOOD PRESSURE: 60 MMHG

## 2018-03-22 VITALS — SYSTOLIC BLOOD PRESSURE: 109 MMHG | DIASTOLIC BLOOD PRESSURE: 61 MMHG

## 2018-03-22 VITALS — DIASTOLIC BLOOD PRESSURE: 57 MMHG | SYSTOLIC BLOOD PRESSURE: 107 MMHG

## 2018-03-22 NOTE — DISCHARGE SUMMARY REPORT-PSYCH
Visit Information
 
Visit Dates/Diagnosis'
Admission Date:
03/14/18
 
Discharge Date: 03/23/18
Reason for Admission:
worsening depression and suicidal ideation.
Psy Discharge Primary Diag: Unspecified Depressive DO Opioid Use DO
Psy Discharge Secondary Diag: Stimulant Use DO Cannabis Use DO
 
Hospital Course
Significant Lab Findings:
Lab
 
 
Cholesterol 137 MG/DL 03/13/18 1245
 
Cholesterol/HDL Ratio 3 % 03/13/18 1245
 
HDL Cholesterol 51 mg/dL 03/13/18 1245
 
Hemoglobin A1c 5.7 % 03/13/18 1245
 
LDL Cholesterol, Calc 66 mg/dL 03/13/18 1245
 
Triglycerides 102 mg/dL 03/13/18 1245
 
Methadone Screen > 735 NG/ML H 03/13/18 1246
 
Ur Phencyclidine Scrn > 72.00 NG/ML H 03/13/18 1246
 
Urine Cocaine Screen > 1000 NG/ML H 03/13/18 1246
 
 
 
Course
Complications:
The patient did not have any complications while she was in the inpatient 
psychiatric unit.  The patient reported to 1 of the nurses that she thinks that 
there is a tampon stuck in her vagina and gynecology consult was called in 
however the patient decided to leave before the gynecologist came to see her and
she was instructed to schedule a follow-up appointment with her gynecologist as 
an outpatient
Consultations:
The patient's was seen by internist/hospitalist while she was in the inpatient 
psychiatric unit
Allergies:
Coded Allergies:
latex (Severe, ITCHY RASH 08/02/17)
lactose (Severe, LACTOSE INTOLERANT 08/02/17)
tramadol (Severe, ITCHING 08/02/17)
 
Hospital Course/TX Response:
March 15, 2018:
Patient appears overly fatigued at this time.  Tox screen positive for methadone
, cocaine, PCP.  We will need to reevaluate tomorrow. For now, we will 
discontinue p.o. Ativan and Benadryl, hydroxyzine changed from a scheduled 
medication to as needed for anxiety. CIWA triggered ativan remains.
Continue methadone taper
Seroquel 200 mg at bedtime
Lexapro 10 mg daily;; DSM 5 Diagnosis(es): F32.9 Unspecified Depression F32.9
F14.20 Stimulant Use Disorder - Cocaine TypeF14.20
F10.20 Alcohol Use Disorder 10.20
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
F12.20 Cannabis Use Disorder F12.20
Treatment Plan:
PLAN:
The patient will be monitored on the unit for safety, polysubstance abuse 
withdrawal, depression, suicidal ideation. Additional information is needed from
collaterals, including friend Nino, her Father and 26 year old daughter.
 
March 16, 2018:
PLAN: Slow progress. 1.  Increase Lexapro to 20 mg daily for continued 
depression and anxiety.
2.  Increase gabapentin as needed to 400 mg for continuing breakthrough anxiety.
3.  Patient has requested additional lorazepam.  I explained to her that there 
was CIWA triggered Lorazepam ordered, and that she would receive Lorazepam if 
indicated.  She expressed her unhappiness with this decision. 4.  Continue 
methadone taper.
5.  Consider disposition to residential rehab, if possible to obtain a bed.
 
March 17, 2018:
Address dynamic risk factors w/ med adjustments, education. Changed seroquel to 
100mg at 4pm and 100mg at night time, she stated she always takes it in the day 
time but agreed to this change for now. She wanted haldol for anxiety/agitation 
and I explained to her that it was similar to seroquel but she stated it was 
helpful when she took it last, will maintain as less frequent dosing. Will add 
in nasonex for congestion which is notable.
 
March 18, 2018:
Changed seroquel to 100mg at 4pm and 100mg at night time, she stated she always 
takes it in the day time but agreed to this change for now. She wanted haldol 
for anxiety/agitation and I explained to her that it was similar to seroquel but
she stated it was helpful when she took it last, will maintain as less frequent 
dosing. Will add in nasonex for congestion which is notable. 
 
March 19, 2018:
A: A 42-year-old woman w/ hx depressive sx and significant drug use d/o.
Plan:
One dose of methadone 5mg because of mild reidual withdrawal
Voltaren PRN for shoulder pain.
 
 
March 20, 2018:
Plan: Repeat one dose of methadone 5mg because of mild reidual withdrawal.
 
March 21, 2018:
Assessment: Cydney is a 42-year-old single Black female who was admitted to 
the inpatient psychiatric unit at [facility] on 3/14/2018 with feelings 
of depression (10/10), anxiety (10/10), feeling hopeless and suicidal, but not 
having a plan. She reported trouble sleeping, poor concentration and poor 
appetite. Pt reports at least 2 prior suicide attempts (? reliability of this 
report). The precipitant appeared to be homelessness and drug withdrawals. 
Today, Cydney was agreeable to see how she does without the 5 mg of methadone 
she got the past couple of days
She asked for an increase in Seroquel because she feels (and seemed) down ; 
Treatment Plan Update: Increase Seroquel to 50 mg in AM, 100 mg at 4PM and 100 
mg at bedtime; Continue same other medications
 
 
 
March 22, 2018: Treatment Plan Update:
May be discharged with follow up with apt then residential rehab
The patient's progress and treatment plan were reviewed in the multidisciplinary
treatment team meeting.  The disciplines involved in the team meeting: RN, LCSW,
OTR/L, Art Therapist, and psychiatrist
 
March 23, 2018: Mental Status Examination:
The patient was alert, and oriented to time place and person.  She reported that
her mood was "very good' and she did appear to be in very good spirits.
  She denied feeling hopeless or worthless, denied wishing death, and denied 
thoughts of suicide. Her speech was normal/not pressured, not slurred. The 
patient was coherent. She denied hallucinations.  There were no delusions.  She 
denied thoughts of violence or homicide
 
Assessment: 
Cydney is a 42-year-old single Black female who was admitted to the inpatient 
psychiatric unit at [facility] on 3/14/2018 with feelings of depression (
10/10), anxiety (10/10), feeling hopeless and suicidal, but not having a plan. 
She reported trouble sleeping, poor concentration and poor appetite. The 
precipitant appeared to be homelessness and drug withdrawals. 
Today, Cydney was in very good spirits and denied thoughts of suicide for the 
third day in a row
 
Treatment Plan Update:
D/C home/to stay with daughter until she goes to rehab or [facility]
 
 
Discharge HBIPS
- Tobacco Use Treatment Offered
Post DC Medications Offered: Script Given-See Med List
Post DC Tobacco Treatment Plan: [place] Tobacco Tx Pgm
- EtOH/Drug Use D/O Treatment Offered
Post DC Medications Offered: Script Given-See Med List
Post DC EtOH/SubAbuse TX Plan: Other SubAbuse/Dual Pgm
 
Metabolic Screening
- Screen if on a Neuroleptic Medication
- Metabolic screening should include:
- Blood Pressure, BMI, Glucose or Hgb A1c, & a
- Lipid profile from within the past 365 days.
Metabolic Screening
 
BMI: 21.100     
 
Blood Pressure: 108/62
 Lab
 
 
Albumin/Globulin Ratio 1.2 % 10/31/17 1710
 
Cholesterol 137 MG/DL 03/13/18 1245
 
Cholesterol/HDL Ratio 3 % 03/13/18 1245
 
HDL Cholesterol 51 mg/dL 03/13/18 1245
 
Hemoglobin A1c 5.7 % 03/13/18 1245
 
LDL Cholesterol, Calc 66 mg/dL 03/13/18 1245
 
Triglycerides 102 mg/dL 03/13/18 1245
 
 
 
 
 
 
Discharge Instructions
 
General Discharge Information
Multiple Neuroleptics:
Not Applicable
   
Discharge Diet Regular
Discharge Activity As Tolerated
DC Disposition:
Home to daughter
Referrals
Ordered Referrals
Provider Referral 03/23/18
For Groups:
[[facility]]
 
[address]  
[phone]  
  
Patient will go to the [facility] on 
  
[address] [place] [address] at 8:30am on, 
Friday,   
3/23/18 to meet with a clinician in 
order to re-engage in IOP.  Patient will 
speak with a nurse about having her PPD 
read.  
  
Patient has been referred to inpatient 
programs and will continue to call and 
follow up.
 
Provider Referral 04/04/18
For Providers:
[[facility]]
 
For Groups:
[[facility] Discharge [facility]]
 
Post Discharge Smoking Cessation Group  
[address]  
[phone]  
  
This group meets every other Wednesday 
at 4pm.  
  
**The next group meets on   
Wednesday, 4/4/18, at 4pm.
 
 
 
Prescriptions
Stop taking the following medications:
Quetiapine Fumarate (Seroquel) 200 MG TABLET ORAL Every night 
 
Hydroxyzine Pamoate (Vistaril) 50 MG CAPSULE ORAL 4 TIMES A DAY 
 
Trazodone HCl (Trazodone HCl) 50 MG TABLET ORAL Every night 
 
Methadone HCl (Methadone HCl) 5 MG TABLET ORAL DAILY 
 
Lorazepam (Ativan) 2 MG TABLET ORAL THREE TIMES A DAY AS NEEDED as needed for 
ANXIETY 
 
Oxycodone HCl (Oxycodone HCl) 5 MG CAPSULE ORAL 2 x Daily as needed as needed 
for PAIN 
 
Continue taking these medications:
Pantoprazole Sodium (Protonix) 40 MG TABLET.
    1 Tablet ORAL DAILY
    Comments:
       Last Taken:PRILOSEC 40 MG ADMINISTERED 3/22/18 @ 0709
        
             Time:
 
Escitalopram Oxalate (Lexapro) 10 MG TABLET
    3 Tablet ORAL DAILY
    Qty = 45
    Comments:
       Last Taken:3/22/18
             Time:0834
    This prescription has been renewed
 
Start taking the following new medications:
Albuterol Sulfate (Ventolin Hfa) 90 MCG HFA.AER.AD
    2 Puff Inhale through mouth Every 4 hours as needed for SHORTNESS OF BREATH
    Qty = 1
    No Refills
    Comments:
       Last Taken:3/22/18
             Time:1617
 
Nicotine (Nicotine Patch) 14 MG/24 HOUR PATCH.TD24
    14 Milligram On the skin DAILY
    Qty = 14
    No Refills
    Comments:
       Last Taken:3/22/18
             Time:0834
 
Quetiapine Fumarate (Quetiapine Fumarate) 100 MG TABLET
    100 Milligram ORAL THREE TIMES DAILY
    Qty = 45
    No Refills
    Comments:
       Last Taken:3/22/18
             Time:1606
 
 
Other Inst/Recommendations Follow up with your Gynecologist
Studies Pending at Discharge
None
Copies To:
[facility]

## 2018-03-22 NOTE — PATIENT DISCHARGE INSTRUCTIONS
Psych Discharge Inst
 
General Discharge Information
Reason for Admission:
worsening depression and suicidal ideation. 
Psy Discharge Primary Diag+ Unspecified Depressive DO Opioid Use DO
Psy Discharge Secondary Diag+ Stimulant Use DO Cannabis Use DO
Summary Tests/Major Procedures
 Lab
 
 
Cholesterol 137 MG/DL 03/13/18 1245
 
Cholesterol/HDL Ratio 3 % 03/13/18 1245
 
HDL Cholesterol 51 mg/dL 03/13/18 1245
 
Hemoglobin A1c 5.7 % 03/13/18 1245
 
LDL Cholesterol, Calc 66 mg/dL 03/13/18 1245
 
Triglycerides 102 mg/dL 03/13/18 1245
 
Methadone Screen > 735 NG/ML H 03/13/18 1246
 
Ur Phencyclidine Scrn > 72.00 NG/ML H 03/13/18 1246
 
Urine Cocaine Screen > 1000 NG/ML H 03/13/18 1246
 
 
Studies Pending at DC:
None
 
Patient Instructions
Contact Information
Your Psychiatrist on [facility] was Gharaibeh MD,Rene
 
* If you are experiencing an emergency related to this hospitalization, please 
call [phone] to contact the treating psychiatrist or the psychiatrist-on-
call.
 
* To Request a copy of your medical records, please contact the Medical Records 
Department at [phone].
 
* To request results of studies pending at the time of discharge, please call 
[phone].
 
* Continue your Medications until directed to stop by your Healthcare provider.
 
General Medication Information
Please continue to take your new medications and your continued home medications
, unless otherwise indicated on your discharge medication list, or unless 
directed by your MD or APRN to stop them.
 
 
Special Instructions
Diet Regular
Activity As Tolerated
Other Inst/Recommendations Follow up with your Gynecologist
- Tobacco Use Treatment Offered
Post DC Medications Offered: Script Given-See Med List
Post DC Tobacco Treatment Plan: Refused Tobacco Tx Pgm
- EtOH/Drug Use D/O Treatment Offered
Post DC Medications Offered: Med Not Indicated for D/O
Post DC EtOH/SubAbuse TX Plan: Other SubAbuse/Dual Pgm
Metabolic Screening
Patient on a neuroleptic(s) .
     Enter below results for Hemoglobin A1C, 
     and lipid panel if obtained during the last 365 days.
 
BMI: 21.100     
 
Blood Pressure: 97/53
 
Laboratory Results From [facility] (If applicable):
Lab
 
 
Cholesterol 137 MG/DL 03/13/18 1245
 
Cholesterol/HDL Ratio 3 % 03/13/18 1245
 
HDL Cholesterol 51 mg/dL 03/13/18 1245
 
Hemoglobin A1c 5.7 % 03/13/18 1245
 
LDL Cholesterol, Calc 66 mg/dL 03/13/18 1245
 
Triglycerides 102 mg/dL 03/13/18 1245
 
 
 
 
Advance Directives
Does the Patient have Medical Advance Directives No/Refused further info
Does Pt have Psychiatric Advance Directives? No/Refused further info
Does Patient have a Designated Surrogate Decision Maker: No
Information About Psychiatric Advance Directives Provided? Refused
 
Discharge Plan
Post Hospital Treatment Plan:
[facility]

## 2018-03-22 NOTE — SOCIAL WORKER PROG NOTE PSYCH
Social Work Progress Note
Progress Note
11:50am
This writer met with patient.  She stated that she planned to return to her 
daughter, Shelby's home to stay after discharge until she identifies an 
inpatient rehab bed.  This writer and patient contacted her daughter by phone at
[phone].  Shelby stated that her mother may not stay with her due to her 
substance use.  She was informed that referrals have been made to inpatient 
programs and the patient plans to continue to call each program until a bed is 
available.  Shelby maintained that the patient may not stay with her.  Patient 
stated that she has a sober and safe friend she can stay with in [place], 
which would allow her easy access to the [facility] where she will attend 
IOP while waiting for an inpatient bed.  Patient refused to provide the name and
address of this friend and did not want to sign an ROSLYN or for the friend to be 
contacted prior to discharge.  Patient stated that she would contact her father 
for a ride from the hospital to her friend's house as she would like to see her 
father upon discharge.  Patient denied SI/HI/AH/VH, appeared receptive to the 
recommendation to attend 12 Step Meetings.  She identified a safety plan in 
which she would "call 911 or go to the ER."  She was also informed that she 
would be provided with crisis numbers and warm lines upon discharge, which she 
was agreeable to utilizing.
 
This writer provided the patient with the following information:
-[facility] [place]: [phone], per Ailin at 2:41pm, patient may
follow up with them after discharge to inquire about bed availability
-[facility] [place]: [phone], per Chapin at 2:43pm, no available
beds today, patient may follow up with them after discharge to inquire about bed
availability
-[facility]: [phone], patient will follow up with [facility] for IOP as well 
as bed availability on their inpatient unit
-[facility]: [phone], patient will follow with them regarding bed 
availability
-[facility]: [phone], patient may follow up with them for bed availability, 
currently a four week wait
-[facility]: [phone], patient will follow with them regarding bed 
availability
-[facility]: [phone], this writer spoke with Allison at 2:56pm: patient
may follow up with them for bed availability and to ask for Allison; beds 
anticipated during the  week of April, PPD results requested which will be 
read tomorrow, 3/23/18
 
This writer spoke with Rebecca at the [facility] Office.  She 
stated that the patient should ask a nurse tomorrow at [facility] to read 
the PPD results.  If unable to, patient may go to [facility] to have it 
read.  Patient was informed and agreeable to this plan.
 
6:07pm
Multiple attempts were made to reach the patient's father regarding 
transportation.  He did not respond and this writer spoke with Umberto at [facility] to 
schedule a ride.   time is 1-4 hours, ref number: [id number].
 
Due to multiple unsuccessful attempts to reach her father, patient stated that 
she did not feel comfortable leaving [facility] and felt safer to discharge 
tomorrow.  Patient was encouraged to include the friend she will be staying with
in [place] in her discharge plan and allow for a meeting (phone or in person) 
to occur.  Shortly after this occurred, the patient's father called.  This 
writer and patient spoke with her father who refused to provide transportation 
from the hospital stating that his daughter needed help and was not ready to 
leave the hospital.  Discharge plans were reviewed and her father was informed 
that referrals had been made to multiple inpatient programs with the plan to 
attend [facility] at the [facility] beginning tomorrow while she continues to call 
the inpatient programs in interest of a bed.  He was also informed that [facility], however, did not have any available beds today. 
This writer reviewed the case with Dr. Jones and the discharge was cancelled for
tonight with reassessment tomorrow.  This writer requested that the unit clerk 
cancel the [facility] to cancel tonight's ride.
 
This writer spoke with Gris at [facility] at [phone] regarding the authorization
due to the [facility] discharge being completed earlier today.  This writer was 
transferred to the clinician, Cayla, who changed the discharge date to 3/23/18. 
 She instructed this writer to complete a concurrent review (see below).  This 
writer also left a vm for Mariela at [facility] ([phone]) to alert of the 
situation as directed by Cayla.
 
Member Name 
Member ID 
Member  
Subscriber Name 
Subscriber ID 
CHEYENNE GIANG 
[id number] 
1975 
CHEYENNE GIANG 
[id number] 
Pended Authorization # 
Client Authorization # 
Type of Request 
[id number] 
[id number] 
CONCURRENT 
Date of Admission/ Start of Services 
Requested From 
Submission Date 
2018 
Level of Service 
Type of Service 
Level of Care 
Type of Care 
INPATIENT/OC 
Mental Health 
Inpatient 
Inpatient Hospital - Inpatient Hospital 
Faxed Referral(s)
   Referred To: [facility]
   Transition of Care Documents sent: Health Summary
   Faxed to: [facility]
   Fax #: [phone]

## 2018-03-22 NOTE — CP SOUTH PROGRESS NOTE PSYCH
Psych (Inpt) Progress Note
Progress Note
Vital Signs
 
 
Date Time Temp Pulse Resp B/P B/P O2
 
03/22 1200  98  97/53  
 
03/22 1103  102  104/55  
 
03/22 0835  85  109/61  
 
03/22 0816 97.2 85  109/61  
 
 
 
The patient's progress and treatment plan were reviewed in the multidisciplinary
treatment team meeting.  The disciplines involved in the team meeting: RN, LCSW,
OTR/L, and psychiatrist
 
Mental Status Examination:
The patient was alert, and oriented to time place and person.  
Today the patient is back to reporting some withdrawal symptoms.
She reported that her mood was better and that she may be discharged today 
provided that her daughter allows her to stay with her while she is waiting for 
a bed at the residential rehabilitation.  She denied feeling hopeless or 
worthless, denied wishing death, and denied thoughts of suicide. Her speech was 
normal/not pressured, not slurred
Her affect was of good range, and congruent. She was coherent. She denied 
hallucinations.  There were no delusions.  She denied thoughts of violence or 
homicide
 
After meeting with her she was observed in the common area in the front of the 
nursing station being verbally abusive towards the nurses.
She is immature and impulsive.
 
Assessment: 
Cydney is a 42-year-old single Black female who was admitted to the inpatient 
psychiatric unit at [facility] on 3/14/2018 with feelings of depression (
10/10), anxiety (10/10), feeling hopeless and suicidal, but not having a plan. 
She reported trouble sleeping, poor concentration and poor appetite. Pt reports 
at least 2 prior suicide attempts (? reliability of this report). The 
precipitant appeared to be homelessness and drug withdrawals. 
Today, Cydney was in better spirits and asking about discharge
In the afternoon she came to my office and reported that her daughter may not 
allow her to stay with her but that she has 2 friends any [place] where she 
can stay with and go to the [facility] from there
 
Treatment Plan Update:
May be discharged with follow up with apt then residential rehab

## 2018-03-23 VITALS — SYSTOLIC BLOOD PRESSURE: 108 MMHG | DIASTOLIC BLOOD PRESSURE: 62 MMHG

## 2018-03-23 VITALS — DIASTOLIC BLOOD PRESSURE: 62 MMHG | SYSTOLIC BLOOD PRESSURE: 108 MMHG

## 2018-03-23 NOTE — CP SOUTH PROGRESS NOTE PSYCH
Psych (Inpt) Progress Note
Progress Note
Vital Signs
 
 
Date Time Temp Pulse Resp B/P B/P Pulse O2 O2 Flow FiO2
 
     Mean Ox Delivery Rate 
 
03/23 1234  96  108/62     
 
03/23 1212  96  108/62     
 
03/22 1954 98.4 92  103/60     
 
03/22 1604  96  107/57     
 
 
The patient's progress and treatment plan were reviewed in the multidisciplinary
treatment team meeting.  The disciplines involved in the team meeting: RN, LCSW,
OTR/L, Art Therapist, and psychiatrist
 
Mental Status Examination:
The patient was alert, and oriented to time place and person.  
She reported that her mood was "very good' and she did appear to be in very good
spirits.
  She denied feeling hopeless or worthless, denied wishing death, and denied 
thoughts of suicide. Her speech was normal/not pressured, not slurred. The 
patient was coherent. She denied hallucinations.  There were no delusions.  She 
denied thoughts of violence or homicide
 
Assessment: 
Cydney is a 42-year-old single Black female who was admitted to the inpatient 
psychiatric unit at  on 3/14/2018 with feelings of depression (
10/10), anxiety (10/10), feeling hopeless and suicidal, but not having a plan. 
She reported trouble sleeping, poor concentration and poor appetite. The 
precipitant appeared to be homelessness and drug withdrawals. 
Today, Cydney was in very good spirits and denied thoughts of suicide for the 
third day in a row
 
Treatment Plan Update:
D/C home/to stay with daughter until she goes to rehab or [facility]

## 2018-03-23 NOTE — SOCIAL WORKER PROG NOTE PSYCH
Social Work Progress Note
Progress Note
This writer met with patient.  She expressed eagerness to discharge today.  
Patient agreed to the discharge plan of attending IOP at the [facility] and 
will present to their clinic tomorrow.  The clinic is open at 5am and she is 
requested to be there before 3pm.  Patient stated that she will go at 8:30am.  
Patient stated that she was interested in a referral to [facility] as this 
writer had been informed this morning that they have available beds.  The 
referral was sent and patient was agreeable to completing a phone screening with
them by phone today.  This writer was informed that there were no female beds 
available today and that the patient could follow up from home.  Patient later 
informed this writer that she was not interested due to the distance from home 
and was not interested in their contact information.  Patient denied SI/HI/AH/
VH.  Patient stated that she would contact her father for a ride from the 
hospital.
 
12:52pm
Patient approached this writer and stated that she will be staying with her 
daughter upon discharge.  Patient was agreeable to a phone meeting with her 
daughter, Shelby.  Patient and this writer contacted her daughter, Shelby, who
was informed of the plan for the patient to attend IOP at the [facility] 
until an inpatient is available.  Shelby did not identify any concerns about 
discharge today and requested that the patient contact her with a time for 
discharge.  Shelby will meet the patient at the hospital with an Uber ride.
Faxed Referral(s)
   Referred To: [facility]

## 2018-04-16 ENCOUNTER — HOSPITAL ENCOUNTER (INPATIENT)
Dept: HOSPITAL 68 - ERH | Age: 43
LOS: 10 days | DRG: 754 | End: 2018-04-26
Attending: PSYCHIATRY & NEUROLOGY | Admitting: PSYCHIATRY & NEUROLOGY
Payer: COMMERCIAL

## 2018-04-16 VITALS — HEIGHT: 63 IN | BODY MASS INDEX: 24.1 KG/M2 | WEIGHT: 136 LBS

## 2018-04-16 DIAGNOSIS — F32.9: Primary | ICD-10-CM

## 2018-04-16 LAB
ABSOLUTE GRANULOCYTE CT: 1.6 /CUMM (ref 1.4–6.5)
BASOPHILS # BLD: 0 /CUMM (ref 0–0.2)
BASOPHILS NFR BLD: 0.4 % (ref 0–2)
EOSINOPHIL # BLD: 0.1 /CUMM (ref 0–0.7)
EOSINOPHIL NFR BLD: 3.3 % (ref 0–5)
ERYTHROCYTE [DISTWIDTH] IN BLOOD BY AUTOMATED COUNT: 18.4 % (ref 11.5–14.5)
GRANULOCYTES NFR BLD: 37.5 % (ref 42.2–75.2)
HCT VFR BLD CALC: 34 % (ref 37–47)
LYMPHOCYTES # BLD: 2.1 /CUMM (ref 1.2–3.4)
MCH RBC QN AUTO: 25 PG (ref 27–31)
MCHC RBC AUTO-ENTMCNC: 32 G/DL (ref 33–37)
MCV RBC AUTO: 78.2 FL (ref 81–99)
MONOCYTES # BLD: 0.5 /CUMM (ref 0.1–0.6)
PLATELET # BLD: 266 /CUMM (ref 130–400)
PMV BLD AUTO: 10 FL (ref 7.4–10.4)
RED BLOOD CELL CT: 4.35 /CUMM (ref 4.2–5.4)
WBC # BLD AUTO: 4.3 /CUMM (ref 4.8–10.8)

## 2018-04-16 PROCEDURE — G0480 DRUG TEST DEF 1-7 CLASSES: HCPCS

## 2018-04-16 NOTE — ED PSYCHIATRIC COMPLAINT
**See Addendum**
History of Present Illness
 
General
Chief Complaint: Psychiatric Related Complaint
Stated Complaint: "I DONT FEEL SAFE"
Source: patient
Exam Limitations: poor historian
 
Vital Signs & Intake/Output
Vital Signs & Intake/Output
 Vital Signs
 
 
Date Time Temp Pulse Resp B/P B/P Pulse O2 O2 Flow FiO2
 
     Mean Ox Delivery Rate 
 
04/17 1025 97.6 64 18 112/54  96 Room Air  
 
04/17 0715 97.6        
 
04/17 0632 97.6        
 
04/17 0620 97.6 82 18 117/76  99 Room Air  
 
04/16 2329 98.4 88 20 126/74  98 Room Air  
 
04/16 1933 97.8 95 18 141/79  99 Room Air  
 
04/16 1713 97.2 80 18 143/91  97 Room Air  
 
 
 ED Intake and Output
 
 
 04/17 0000 04/16 1200
 
Intake Total  
 
Output Total  
 
Balance  
 
   
 
Patient 140 lb 
 
Weight  
 
 
 
Allergies
Coded Allergies:
latex (Severe, ITCHY RASH 08/02/17)
lactose (Severe, LACTOSE INTOLERANT 08/02/17)
tramadol (Severe, ITCHING 08/02/17)
 
Reconcile Medications
Albuterol Sulfate (Ventolin Hfa) 90 MCG HFA.AER.AD   2 PUF INH Q4 PRN SHORTNESS 
OF BREATH
Escitalopram Oxalate (Lexapro) 10 MG TABLET   3 TAB PO DAILY MENTAL HEALTH
Nicotine (Nicotine Patch) 14 MG/24 HOUR PATCH.TD24   14 MG TOP DAILY Smoking 
cessation
Pantoprazole Sodium (Protonix) 40 MG TABLET.DR   1 TAB PO DAILY GI  (Reported)
Quetiapine Fumarate 100 MG TABLET   100 MG PO TID mood stabilization
 
Triage Note:
PT TO ER C/C +SI THOUGHTS, NO PLAN. ADMITS TO
 DRINKING ETOH ALMOST DAILY, LAST DRINK LAST NIGHT.
 USING COCAINE FREQUENTLY. DENIES HI THOUGHT. PT
 ANXIOUS, TEARFUL.
Triage Nurses Notes Reviewed? yes
Onset: Abrupt
Duration: unknown duration
Timing: recent history
Pregnant: No
Patient currently breastfeeds: No
HPI:
 
 
 
 
4/16/18
6:36 PM
42-year-old female presents to the emergency department for depression with 
suicidal ideation.  The patient states she was recently told that she has to 
have multiple dental extractions.  She says she has numerous medical problems.  
She says that she is been on methadone.
(Barrera Rebolledo DO)
 
Past History
 
Travel History
Traveled to [place] past 21 day No
 
Medical History
Any Pertinent Medical History? see below for history
Neurological: NONE
EENT: NONE
Cardiovascular: NONE
Respiratory: asthma
Gastrointestinal: peptic ulcer disease, GASTRITIS
Hepatic: NONE
Renal: NONE
Musculoskeletal: sciatica, CARPAL TUNNEL C5/6 HERNIATED DISCS
Psychiatric: anxiety, insomnia, opioid dependence, PANIC ATTACKS
Endocrine: NONE
Blood Disorders: SMV THROMBOSIS "CLOTTING DISORDER"
Cancer(s): NONE
GYN/Reproductive: NONE
Tetanus Vaccine: 01/24/15
 
Surgical History
Surgical History: non-contributory
 
Psychosocial History
Who do you live with Homeless
Services at Home None
What is your primary language English
Tobacco Use: Current Daily Use
Daily Tobacco Use Amount/Type: => 5 Cigarettes daily
 
Family History
Family History, If Any:
FATHER
Relation not specified for:
  FH: CAD (coronary artery disease)
 
Hx Contributory? No
(Barrera Rebolledo DO)
 
Review of Systems
 
Review of Systems
Constitutional:
Denies: fever. 
EENTM:
Denies: visual changes. 
Respiratory:
Denies: short of breath. 
Cardiovascular:
Denies: chest pain. 
GI:
Reports: no symptoms. 
Genitourinary:
Reports: no symptoms. 
Musculoskeletal:
Reports: no symptoms. 
Skin:
Reports: no symptoms. 
Neurological/Psychological:
Reports: no symptoms. 
Hematologic/Endocrine:
Reports: no symptoms. 
Immunologic/Allergic:
Reports: no symptoms. 
(Barrera Rebolledo DO)
 
Physical Exam
 
Physical Exam
General Appearance: alert, awake, anxious, mild distress
Head: atraumatic, normal appearance
Eyes:
Bilateral: normal appearance, PERRL, EOMI. 
Ears, Nose, Throat: normal pharynx, normal ENT inspection
Neck: normal inspection
Respiratory: normal breath sounds, chest non-tender, no respiratory distress
Cardiovascular: regular rate/rhythm
Gastrointestinal: non-tender
Extremities: normal range of motion
Neurological/Psychiatric: no motor/sensory deficits, awake, alert, oriented x 3
Appearance/Memory/Insight: disheveled
Behavoir/Eye Contact/Speech: cooperative
Thoughts/Hallucinations: normal thought pattern
Skin: intact, normal color, warm/dry
SAD PERSONS
 
 
SAD PERSONS Response Value
 
Depression/Hopelessness? yes 2
 
Previous Attempts/Psych Care yes 1
 
Excessive Ethanol/Drug Use? yes 1
 
Rational Thinking Loss? yes 2
 
Single//? yes 1
 
Social Support? has no support 1
 
Total   8
 
 
SAD PERSONS Done? yes
(Barrera Rebolledo DO)
 
Progress
Differential Diagnosis: drug intoxication, drug overdose, drug withdrawal, 
DEPRESSION
Plan of Care:
 Orders
 
 
Procedure Date/time Status
 
Heart Healthy Diet 04/17 B Active
 
URINE PREGNANCY 04/17 0830 Complete
 
URINE DRUG SCREEN FOR ER ONLY 04/17 0830 Complete
 
Continuous Observation Monitor 04/16 1738 Active
 
ETHANOL 04/16 1738 Complete
 
COMPREHENSIVE METABOLIC PANEL 04/16 1738 Complete
 
CBC WITHOUT DIFFERENTIAL 04/16 1738 Complete
 
ED CRISIS PSYCH CONSULT 04/16 1738 Active
 
 
 Current Medications
 
 
  Sig/Colten Start time  Last
 
Medication Dose  Stop Time Status Admin
 
Lorazepam 1 MG Q4P PRN 04/17 0645 AC 
 
(Ativan)     
 
Ibuprofen 800 MG Q6P PRN 04/17 0615 AC 04/17
 
(Motrin)     0632
 
 
 Laboratory Tests
 
 
 
04/17/18 0945:
Urine Opiates Screen 265, Methadone Screen 643  H, Barbiturate Screen < 60, Ur 
Phencyclidine Scrn < 6.00, Amphetamines Screen < 100, U Benzodiazepines Scrn < 
85, Urine Cocaine Screen > 1000  H, Urine Cannabis Screen < 5.00, Urine 
Pregnancy Test NEGATIVE
 
04/16/18 1745:
Anion Gap 8, Estimated GFR > 60, BUN/Creatinine Ratio 14.3, Glucose 92, Calcium 
9.3, Total Bilirubin 0.4, AST 18, ALT 17, Alkaline Phosphatase 65, Total Protein
6.8, Albumin 3.8, Globulin 3.0, Albumin/Globulin Ratio 1.3, CBC w Diff NO MAN 
DIFF REQ, RBC 4.35, MCV 78.2  L, MCH 25.0  L, MCHC 32.0  L, RDW 18.4  H, MPV 
10.0, Gran % 37.5  L, Lymphocytes % 48.0, Monocytes % 10.8  H, Eosinophils % 3.3
, Basophils % 0.4, Absolute Granulocytes 1.6, Absolute Lymphocytes 2.1, Absolute
Monocytes 0.5, Absolute Eosinophils 0.1, Absolute Basophils 0, Serum Alcohol < 
10.0
 
04/16/18 1738:
Methadone Screen Cancelled, Barbiturate Screen Cancelled, Ur Phencyclidine Scrn 
Cancelled, Amphetamines Screen Cancelled, U Benzodiazepines Scrn Cancelled, 
Urine Cocaine Screen Cancelled, Urine Cannabis Screen Cancelled, Urine Pregnancy
Test Cancelled
 
(Barrera Rebolledo DO)
Hand-Off
   Endorsed To:
Rossana WATTS,Barrera TONEY
   Endorsed Time: 0700
   Pending: consult
(Elvis WATTS,Erlin MATOS)
Comments:
4/17/2018 10:07:16 AM I was asked to see patient due to worsening agitation.  
Patient states that she is feeling unsafe in the emergency department because of
the  presents.  In addition she states that she got warm milk to 
drink and is making her feel nauseous.  She states she takes Seroquel Ativan and
methadone daily and I will order these along with cold milk for her to drink.  
Patient is pending crisis evaluation.
 
4/17/2018 11:27:26 AM patient signed out to me by Dr. Leal at shift change 
over at 7 AM.  Patient signed out to Dr. Rebolledo.
(Rossana WATTS,Barrera TONEY)
 
Departure
 
Departure
Disposition: STILL A PATIENT
Condition: Stable
Clinical Impression
Primary Impression: Depression
Referrals:
Patient Has No Primary Care Dr
 
Departure Forms:
Customer Survey
General Discharge Information
Comments
 
 
 
 
4/16/18
6:41 PM
The patient is pending crisis evaluation.  She will be signed out to Dr. Leal at 7 PM.
(Barrera Rebolledo DO)
 
Critical Care Note
 
Critical Care Note
Critical Care Time: 30-74 min
(Barrera Rebolledo DO)
 
Critical Care Time: 30-74 min
(Barrera Rebolledo DO)

## 2018-04-17 VITALS — DIASTOLIC BLOOD PRESSURE: 65 MMHG | SYSTOLIC BLOOD PRESSURE: 118 MMHG

## 2018-04-17 VITALS — DIASTOLIC BLOOD PRESSURE: 80 MMHG | SYSTOLIC BLOOD PRESSURE: 143 MMHG

## 2018-04-17 VITALS — SYSTOLIC BLOOD PRESSURE: 143 MMHG | DIASTOLIC BLOOD PRESSURE: 80 MMHG
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
## 2018-04-18 VITALS — DIASTOLIC BLOOD PRESSURE: 48 MMHG | SYSTOLIC BLOOD PRESSURE: 110 MMHG

## 2018-04-18 VITALS — SYSTOLIC BLOOD PRESSURE: 128 MMHG | DIASTOLIC BLOOD PRESSURE: 72 MMHG

## 2018-04-18 VITALS — SYSTOLIC BLOOD PRESSURE: 107 MMHG | DIASTOLIC BLOOD PRESSURE: 61 MMHG

## 2018-04-18 VITALS — DIASTOLIC BLOOD PRESSURE: 61 MMHG | SYSTOLIC BLOOD PRESSURE: 107 MMHG
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
## 2018-04-18 NOTE — SOCIAL WORKER PROG NOTE PSYCH
Social Work Progress Note
Progress Note
Patient was seen individually in order to get a social history.
Patient was very agreeable to talk, but she had a very hard time focusing, and 
fell asleep several times during the first 10 questions, assuring me each time 
that she was o.k. and wanted to continue.    After the third time, we agreed 
that we would work on it another time, as she was to drowsy, and I was wooried 
she could fall over.

## 2018-04-18 NOTE — SOCIAL WORKER PROG NOTE PSYCH
Social Work Progress Note
Progress Note
  Cydney signed a release for [facility] this morning. Writer faxed 
clincial information to [facility] and received a fax confirmation at 10:51AM. 
Writer called [facility] and spoke with Shahida at admissions. Shahida stated that she
had not yet received the fax at 11:05AM, but that there is usually a lag time in
[facility]'s faxes. She stated that she would call back once the clincial 
information was received and reviewed. 
--------------------------------------------------------------------------------
---------------  
  Writer spoke with Shahida at [facility] this afternoon to confirm that they had 
received the faxed clincial referral. Shahida asked this writer to confirm with 
Cydney that she was okay with the distance and with the $50/week room & board 
fee which had been the reasons she previously refused [facility]'s program last 
month. 
  Writer spoke with Cydney this afternoon. Although Cydney was observed to 
be lethargic, writer observed Cydney successfully gather toiletries and brush 
her teeth. Cydney stated that she was okay with the distance and the fee. 
Additionally, Cydney stated that she had heard that [facility] was a "very 
good program". 
  [place] called back later to ask if Cydney would be able to be brought to
[facility] by a friend or relative so that [facility] could assess her for 
suicidality themselves. Given the barrier of lack of relatives/friends, Rae Kay and writer contacted [facility] as an alternate. Clincial information 
was faxed to [facility]. Writer called to make sure that [facility] had received the
referral, which they had. The [facility] admissions representative stated that she
would review the packet and pass it onto their admissions nurse who would then 
review the information to determine if Cydney would be qualified for their 
program. The number given to this writer to call back tomorrow to check on the 
status of the referral is [phone].
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
## 2018-04-18 NOTE — HISTORY & PHYSICAL
General Information and HPI
MD Statement:
I have seen and personally examined CHEYENNE MARTIN and documented this H&P.
 
The patient is a 42 year old F who presented with a patient stated chief 
complaint of depression, suicidal edeation, & agitation.
 
Source of Information: old records
Exam Limitations: clinical condition, confusion (PATIENT SOMNOLENT-UNABLE TO SP)
History of Present Illness:
The patient is a 43 yo female with h/o GERD, gastritis, SMV thrombosis, 
hypothyroidism, asthma, anxiety/depression and polysubstance abuse who presented
in the ED on the day of admission feeling depressed, unsafe, etc. She had 
similar admission on 3/14/18. She has h/o heroin and cocaine abuse as well as 
EtOH and has been on Methadone. She was heavily sedated and unable to give me 
any history at the time of my exam on [facility] today. 
 
Allergies/Medications
Allergies:
Coded Allergies:
latex (Severe, ITCHY RASH 08/02/17)
lactose (Severe, LACTOSE INTOLERANT 08/02/17)
tramadol (Severe, ITCHING 08/02/17)
 
Home Med list
Albuterol Sulfate (Ventolin Hfa) 90 MCG HFA.AER.AD   2 PUF INH Q4 PRN SHORTNESS 
OF BREATH
Escitalopram Oxalate (Lexapro) 10 MG TABLET   3 TAB PO DAILY MENTAL HEALTH
Nicotine (Nicotine Patch) 14 MG/24 HOUR PATCH.TD24   14 MG TOP DAILY Smoking 
cessation
Pantoprazole Sodium (Protonix) 40 MG TABLET.DR   1 TAB PO DAILY GI  (Reported)
Quetiapine Fumarate 100 MG TABLET   100 MG PO TID mood stabilization
 
Compliance With Home Meds: UNKNOWN
 
Past History
 
Travel History
Traveled to [place] past 21 day No
 
Medical History
Neurological: NONE
EENT: NONE, CHART STATES PT NEEDS DENTAL EXTRACTIONS
Cardiovascular: NONE
Respiratory: asthma
Gastrointestinal: peptic ulcer disease, GASTRITIS
Hepatic: NONE
Renal: NONE
Musculoskeletal: sciatica, CARPAL TUNNEL C5/6 HERNIATED DISCS
Psychiatric: anxiety, insomnia, opioid dependence, PANIC ATTACKS
Endocrine: NONE
Blood Disorders: SMV THROMBOSIS "CLOTTING DISORDER"
Cancer(s): NONE
GYN/Reproductive: NONE
History of MRSA: No
History of VRE: No
History of CDIFF: No
Isolation History: Standard
Tetanus Vaccine: 01/24/15
 
Surgical History
Surgical History: non-contributory
 
Past Family/Social History
 
Family History
Relations & Conditions if any
FATHER
Relation not specified for:
  FH: CAD (coronary artery disease)
 
 
Psychosocial History
Where do you live? Home
Services at Home: None
Primary Language: English
Illicit Drug Use: cocaine, heroin
 
Functional Ability
Ambulation: independent
 
Review of Systems
 
Review of Systems
Constitutional:
Denies: no symptoms. 
EENTM:
Denies: no symptoms (NONE NOTED IN CHART). 
Cardiovascular:
Denies: no symptoms (NONE NOTED IN CHART). 
Respiratory:
Denies: no symptoms (NONE NOTED IN CHART). 
GI:
Denies: no symptoms (NONE NOTED IN CHART). 
Genitourinary:
Denies: no symptoms (NONE NOTED IN CHART). 
Musculoskeletal:
Reports: back pain. 
Skin:
Denies: no symptoms. 
Neurological/Psychological:
Reports: confusion, depressed, emotional problems. 
Hematologic/Endocrine:
Denies: no symptoms. 
Immunologic/Allergic:
Denies: no symptoms. 
 
Exam & Diagnostic Data
Last 24 Hrs of Vital Signs/I&O
 Vital Signs
 
 
Date Time Temp Pulse Resp B/P B/P Pulse O2 O2 Flow FiO2
 
     Mean Ox Delivery Rate 
 
04/18 1646  82  110/48     
 
04/18 1608  83  110/48     
 
04/18 1608  82  110/48     
 
04/18 0937 97.3 82  128/72     
 
04/18 0935 97.3 82  128/72     
 
04/18 0844 98.8 96 16 118/65     
 
04/17 2350  96 16 118/65     
 
04/17 2253 98.8 90  143/80     
 
04/17 2220 98.8 90  143/80     
 
04/17 2105 98.2 78 20 122/76  99 Room Air  
 
04/17 1820 97.9 85 16 124/74  98 Room Air  
 
 
 Intake & Output
 
 
 04/18 1600 04/18 0800 04/18 0000
 
Intake Total   
 
Output Total   
 
Balance   
 
    
 
Patient   136 lb
 
Weight   
 
 
 
 
Physical Exam
General Appearance PATIENT HEAVILY SEDATED- UNABLE TO OBTAIN HISTORY
Skin No Rashes, No Breakdown, No Significant Lesion
HEENT Mucous Membr. moist/pink (POOR DENTITION)
Neck No JVD, No thryomegaly, +2 Carotid Pulse wo Bruit, No LAD
Cardiovascular Regular Rate, Normal S1, Normal S2, No Murmurs
Lungs Clear to Auscultation, Normal Air Movement
Abdomen Normal Bowel Sounds, Soft, No Tenderness, No Hepatospenomegaly, No 
Masses
Neurological
   Exam Findings: UNABLE TO TEST - MOVES ALL EXTREMITIES
   Cranial Nerves II through XII:
UNABLE TO TEST DUE TO CONDITION
Extremities No Clubbing, No Cyanosis, No Edema, Normal Pulses, No Tenderness/
Swelling
Vascular Normal Pulses, Pulses Symmetrical
Last 24 Hrs of Labs/Bk:
Laboratory Tests
 
04/17/18 0945:
Urine Opiates Screen 265, Methadone Screen 643  H, Barbiturate Screen < 60, Ur 
Phencyclidine Scrn < 6.00, Amphetamines Screen < 100, U Benzodiazepines Scrn < 
85, Urine Cocaine Screen > 1000  H, Urine Cannabis Screen < 5.00, Urine 
Pregnancy Test NEGATIVE
 
04/16/18 1745:
Anion Gap 8, Estimated GFR > 60, BUN/Creatinine Ratio 14.3, Glucose 92, Calcium 
9.3, Total Bilirubin 0.4, AST 18, ALT 17, Alkaline Phosphatase 65, Total Protein
6.8, Albumin 3.8, Globulin 3.0, Albumin/Globulin Ratio 1.3, TSH 0.299, CBC w 
Diff NO MAN DIFF REQ, RBC 4.35, MCV 78.2  L, MCH 25.0  L, MCHC 32.0  L, RDW 18.4
 H, MPV 10.0, Gran % 37.5  L, Lymphocytes % 48.0, Monocytes % 10.8  H, 
Eosinophils % 3.3, Basophils % 0.4, Absolute Granulocytes 1.6, Absolute 
Lymphocytes 2.1, Absolute Monocytes 0.5, Absolute Eosinophils 0.1, Absolute 
Basophils 0, Serum Alcohol < 10.0
 
04/16/18 1738:
Methadone Screen Cancelled, Barbiturate Screen Cancelled, Ur Phencyclidine Scrn 
Cancelled, Amphetamines Screen Cancelled, U Benzodiazepines Scrn Cancelled, 
Urine Cocaine Screen Cancelled, Urine Cannabis Screen Cancelled, Urine Pregnancy
Test Cancelled
 
 
Assessment/Plan
Assessment:
Impression/Plan:
#Depression & Suicidal Ideation- noted in ED chart. Unable to get history 
myself.
Plan: Admit to [facility].
        Meds as per psychiatry. Escitalopram/Quetiapine.
 
#Polysubstance Abuse- heroin/cocaine/EtOH. Drug screen positive  for methadone 
and cocaine.
Plan: Methadone to continue. Agree with Clonidine/Bentyl/Baclofen/Ativan/thiamin
/MVI/folate etc. 
 
#H/O Asthma- lungs clear at present.
Plan: Agree with Albuterol prn.
 
#Nicotine Dependence- noted.
Plan: Nicotine patch.
 
#Dental Disease- poor dentition. Orajel ordered.
Plan: Continue Orajel.
 
#H/O SMV Thrombosis- no details.
Plan: Will follow abdominal exam.
 
#GERD- on PPI.
Plan: Omeprazole. 
 
As Ranked By This Provider
Problem List:
 1. Depression
 
 2. Polysubstance abuse
 
 3. Anxiety
 
 4. Chronic dental pain
 
 5. Suicidal ideation
 
 6. GERD
 
 7. Anxiety
 
 8. Asthma
 
 
Miscellaneous
 
Miscellaneous Documentation
Attending Case Discussed With:
Gharaibeh MD,Rene
 
Primary Care Physician:
Unknown
 
Patient sees these Specialists
NONE
Level of Patient Care:  South
Consults Needed:
   Consulting Physician:
NONE
 
Attending MD Review Statement
 
Attending Statement
Attending MD Statement: examined this patient, reviewed EMR data (avail), 
discussed with nursing, amended to note
Attending Assessment/Plan:
As above. Limited exam due to condition.

## 2018-04-19 VITALS — SYSTOLIC BLOOD PRESSURE: 105 MMHG | DIASTOLIC BLOOD PRESSURE: 54 MMHG

## 2018-04-19 VITALS — SYSTOLIC BLOOD PRESSURE: 105 MMHG | DIASTOLIC BLOOD PRESSURE: 48 MMHG

## 2018-04-19 VITALS — SYSTOLIC BLOOD PRESSURE: 109 MMHG | DIASTOLIC BLOOD PRESSURE: 61 MMHG

## 2018-04-19 VITALS — DIASTOLIC BLOOD PRESSURE: 48 MMHG | SYSTOLIC BLOOD PRESSURE: 105 MMHG

## 2018-04-19 VITALS — DIASTOLIC BLOOD PRESSURE: 60 MMHG | SYSTOLIC BLOOD PRESSURE: 120 MMHG

## 2018-04-19 NOTE — SOCIAL WORKER PROG NOTE PSYCH
Social Work Progress Note
Progress Note
Writer called [facility] ([phone]) this morning to check on the status on the
referral was faxed yesterday. Angeles at admissions stated that the writer needed 
to complete [facility]'s 3 page referral form. Writer completed and faxed the form
and along with it refaxed the clinical paperwork that was faxed yesterday. 
Writer called Angeles back later on in the afternoon to confirm that she had 
received it. Angeles stated that she had recevied and reviewed it and had passed 
it along to the nurse in admissions to review. Angeles stated that the nurse would
most likely complete her review and make a decision by tomorrow, Friday, April 20th.
--------------------------------------------------------------------------------
---------------  
  Writer met with Cydney this afternoon. She reported to be feeling "exhausted
" and "like crap". She presented as sedated, only being able to keep her eyes 
open intermittently. Cydney stated that she was having pain in her arms and 
chest which she believed to be caused by stress. She stated that the stress she 
was experiencing was originating from her daughter. She stated that her daughter
was looking to her for support in light of her daugther's boyfriend's 
infidelity. Cydney said, "I am damned if I do, damned if I don't". She 
elaborated that she feels that she wants to help her daughter but also feels as 
though she needs to just focus on "healing" herself right now. 
  Cydney was hopeful that [facility] would accept her for their residential 
rehab program. She stated "I am super ready to go [to rehab]". Cydney denied 
any suicidal ideation, and reported mild depression which she attributed to the 
aforementioned interpersonal difficulties.

## 2018-04-19 NOTE — CP SOUTH PROGRESS NOTE PSYCH
Psych (Inpt) Progress Note
Progress Note
Vital Signs
 
 
Date Time Temp Pulse B/P Pulse O2 O2 Flow FiO2
 
04/19 0812 98.4 79 105/48    
 
04/19 0807 98.4 79 105/48    
 
04/18 2035 98.3 90 107/61    
 
04/18 2034 98.3 90 107/61    
 
04/18 2028  90 107/61    
 
04/18 1646  82 110/48    
 
04/18 1608  83 110/48    
 
04/18 1608  82 110/48    
 
The treatment team discussed the patient's treatment, progress, and aftercare 
plans
 
Mental status examination:
The patient was sedated and tired looking.  She seems to think that she did not 
was because she has not slept for several days before she came to the hospital 
because of cocaine use.  However she remains to be on clonidine and Seroquel and
these were discontinued today and Seroquel was moved to just at bedtime.  Her 
methadone dose was reduced to 20 mg today and will be cut down to 15 mg 
tomorrow.
The patient otherwise reported that she is starting to feel a glimmer of hope 
and she reported that she is interested in going to a residential rehabilitation
program.
Today she denied thinking of suicide and she denied having violent thoughts or 
thoughts of homicide.  She denied hallucinations and did not seem to be 
responding to internal stimuli.  She denied feeling paranoid and there were no 
delusions during the interview.
She does seem to have significant difficulty with attention and concentration 
primarily because she is overmedicated.
 
Assessment:
The patient is a 42-year-old black female known to me from her recent previous 
admission to Inpatient Psychiatry.  She came back to the hospital following 
relapse to alcohol and significant amount of cocaine.  She was having thoughts 
of suicide when she arrived until the emergency room.  She has not shown any 
alcohol withdrawal symptoms and seemed to be pretty sedated today several 
reductions and sedating medications were made today.
Treatment plan update:
Reduce methadone to 20 mg today and  15 mg tomorrow
DC clonidine
Change Seroquel to just 50 mg at bedtime
Continue Lexapro 20 mg daily
Discontinue CIWA protocol and discontinue Ativan coverage
 reevaluate daily

## 2018-04-20 VITALS — DIASTOLIC BLOOD PRESSURE: 68 MMHG | SYSTOLIC BLOOD PRESSURE: 126 MMHG

## 2018-04-20 VITALS — SYSTOLIC BLOOD PRESSURE: 116 MMHG | DIASTOLIC BLOOD PRESSURE: 67 MMHG

## 2018-04-20 VITALS — DIASTOLIC BLOOD PRESSURE: 63 MMHG | SYSTOLIC BLOOD PRESSURE: 104 MMHG

## 2018-04-20 VITALS — SYSTOLIC BLOOD PRESSURE: 138 MMHG | DIASTOLIC BLOOD PRESSURE: 65 MMHG

## 2018-04-20 NOTE — SOCIAL WORKER PROG NOTE PSYCH
Social Work Progress Note
Progress Note
Determination Status:
****************************     PENDED     ****************************
The services requested require additional review. You will be contacted 
regarding the status of this request if further information is needed. An 
authorization decision will be made within the required timeframes and details 
of that decision may be found under the member's authorization history.
Member Name
Member ID
Member 
Subscriber Name
Subscriber ID
CHEYENNE GIANG
[id number]
1975
CHEYENNE GIANG 
[id number]
 
Pended Authorization #
Client Authorization #
Type of Request
 
[id number]
[id number]
CONCURRENT
 
 
Date of Admission/ Start of Services
Requested From
Submission Date
   
2018
   
 
Level of Service
Type of Service
Level of Care
Type of Care
 
INPATIENT/HLOC
MENTAL HEALTH
INPATIENT
INPATIENT HOSPITAL - INPATIENT HOSPITAL
 
 
Reason Code
 
P77
 
 
Provider Name & Address
Provider ID
Provider Alternate ID
NPI # for Authorization
RE[facility]
[address] [place] [id number]
[id number]
[id number]
N/A

## 2018-04-20 NOTE — SOCIAL WORKER PROG NOTE PSYCH
Social Work Progress Note
Progress Note
Dr. Gharaibeh and I met with Cydney this morning. She was sedated sitting in 
the chair in his office.  She stated she is not suicidal, no HI, no AH/VH.  She 
report she wants to go to a rehab.  She has no place to live. She signed [facility]'s 
for rehabs and for [facility].  Faxed clinical to [facility], [facility]
, [facility]  Dr. SANTANA plans to taper her Methadone.  Cydney expressed how she 
wants to do something different this time and get help. Need to complete 
referral for Crisis and Respite.  She needs to call [facility] - needs a list.
(she agreed to sober [facility]). 
 
TC - [facility] - left voicemail if bed was available. Cydney stated she 
thinks she could find someone to bring her there.  
 
TC- [facility] - Admission asked if they reviewed clinical. [facility] returned 
call and stated they will not accept Cydney on Methadone and there are no 
beds.  Phoned [facility] again and informed her per patient and Dr. SANTANA. she will 
be off her Methadone by Mon. 4/23.  Faxed updated note for today - Dr. SANTANA's note.

## 2018-04-20 NOTE — CP SOUTH PROGRESS NOTE PSYCH
Psych (Inpt) Progress Note
Progress Note
Vital Signs
 
 
Date Time Temp Pulse B/P B/P Pulse O2 O2 Flow FiO2
 
04/20 1156  83 104/63     
 
04/20 0759 99.1 91 116/67     
 
 
The treatment team discussed the patient's treatment, progress, and aftercare 
plans
 
Mental status examination:
The patient seemed less sedated.  But she was oriented to person, place, and 
time. The patient denied thoughts of suicide 
she reported that she is interested in going to a residential rehabilitation 
program.
denied having violent thoughts or thoughts of homicide.  She denied 
hallucinations and did not seem to be responding to internal stimuli.  She 
denied feeling paranoid and there were no delusions during the interview.
She has difficulty with attention and concentration.
 
Assessment:
42-year-old black female known to me from her recent previous admission to 
Inpatient Psychiatry.  She came back to the hospital following relapse to 
alcohol and significant amount of cocaine.  She was having thoughts of suicide 
when she arrived at the emergency room.  
The patient is showing signs of improvement and has been denying thoughts of 
suicide for the past 24 hours
 
Treatment plan update:
Reduce methadone to 10 mg Saturday, and 5 mg Sunday 
Continue Seroquel 50 mg at bedtime
Continue Lexapro 20 mg daily
Add Nasal decongestant
 
 
 reevaluate daily

## 2018-04-21 VITALS — SYSTOLIC BLOOD PRESSURE: 135 MMHG | DIASTOLIC BLOOD PRESSURE: 65 MMHG

## 2018-04-21 VITALS — SYSTOLIC BLOOD PRESSURE: 123 MMHG | DIASTOLIC BLOOD PRESSURE: 74 MMHG

## 2018-04-21 VITALS — DIASTOLIC BLOOD PRESSURE: 58 MMHG | SYSTOLIC BLOOD PRESSURE: 116 MMHG

## 2018-04-21 NOTE — CP SOUTH PROGRESS NOTE PSYCH
Psych (Inpt) Progress Note
Progress Note
Include the following elements, when applicable:
Involvement in the active treatment of the patient with behavioral observations 
of the patient and the patient's response to the treatment.
Review of the ongoing treatment process in the context of the treatment plan.
Indication of how multi-disciplinary staff members are carrying out the 
treatment plan.
Plans for future interventions and recommendations for revision of the treatment
plan.
Liaison with other physicians/providers.
Progress Note:
 
Chart reviewed. Progress discussed with nursing staff. Interviewed patient this 
morning. Patient reports "feeling better today". Reports mood improving. She 
questions reduction of previous meds "I wasn't too sedated because of the meds, 
I was too sedated because I was out using drugs for days on end". She denies SI/
HI or AVH. However, she does continue to appear mildly sedated. 
 
Vitals and labs reviewed. Findings are: wnl. No new labs. 
 
Mental status exam: Well groomed AAF, mildly sedated appearing, no abnormal 
movements. Fair eye contact. Speech minimally slurred otherwise wnl. Mood 
"getting better". Affect mildly constricted, non-labile. TP logical, TC wnl. 
Denies SI/HI. Denies perceptual distrubacnes. Cognition grossly intact. I/J 
fair.
 
Assessment and plan: Mood continues to improve as does sedation. Continue 
current management as per primary team.

## 2018-04-22 VITALS — SYSTOLIC BLOOD PRESSURE: 127 MMHG | DIASTOLIC BLOOD PRESSURE: 68 MMHG

## 2018-04-22 VITALS — DIASTOLIC BLOOD PRESSURE: 84 MMHG | SYSTOLIC BLOOD PRESSURE: 136 MMHG

## 2018-04-22 VITALS — DIASTOLIC BLOOD PRESSURE: 59 MMHG | SYSTOLIC BLOOD PRESSURE: 116 MMHG

## 2018-04-22 VITALS — SYSTOLIC BLOOD PRESSURE: 122 MMHG | DIASTOLIC BLOOD PRESSURE: 68 MMHG

## 2018-04-22 NOTE — CP SOUTH PROGRESS NOTE PSYCH
Psych (Inpt) Progress Note
Progress Note
Include the following elements, when applicable:
Involvement in the active treatment of the patient with behavioral observations 
of the patient and the patient's response to the treatment.
Review of the ongoing treatment process in the context of the treatment plan.
Indication of how multi-disciplinary staff members are carrying out the 
treatment plan.
Plans for future interventions and recommendations for revision of the treatment
plan.
Liaison with other physicians/providers.
Progress Note:
 
Chart reviewed. Progress discussed with nursing staff. Interviewed patient this 
morning. Patient requests her seroquel to return to her previous dosing of 200 
mg over the course of the day as she says this is helpful for her mood and 
anxiety. Denies sedation. Reports unusual feeling in her chest. Denies SI/HI. No
AVH. 
 
Vitals and labs reviewed. Findings are: wnl. No new labs. 
 
Mental status exam: Well groomed AAF, not sedated appearing today, no abnormal 
movements. Fair eye contact. Speech minimally slurred otherwise wnl. Mood 
"getting better". Affect mildly constricted, non-labile. TP logical, TC wnl. 
Denies SI/HI. Denies perceptual disturbances. Cognition grossly intact. I/J 
fair.
 
Assessment and plan: Mood continues to improve as does sedation. Will uptitrate 
seroquel to 100 mg BID. Also check EKG. I have very low suspicion of a cardiac 
event as the cause of her chest discomfort. Continue remainder of current 
management as per primary team.

## 2018-04-23 VITALS — SYSTOLIC BLOOD PRESSURE: 115 MMHG | DIASTOLIC BLOOD PRESSURE: 58 MMHG

## 2018-04-23 VITALS — DIASTOLIC BLOOD PRESSURE: 56 MMHG | SYSTOLIC BLOOD PRESSURE: 112 MMHG

## 2018-04-23 VITALS — DIASTOLIC BLOOD PRESSURE: 67 MMHG | SYSTOLIC BLOOD PRESSURE: 112 MMHG

## 2018-04-23 VITALS — SYSTOLIC BLOOD PRESSURE: 118 MMHG | DIASTOLIC BLOOD PRESSURE: 68 MMHG

## 2018-04-23 NOTE — SOCIAL WORKER PROG NOTE PSYCH
Social Work Progress Note
Progress Note
Faxed over documentation to [facility] showing pt last dose of methadone was 4/22/
18.  
 
Pt was alert and planned to complete screening for [facility], and she requested info 
for [facility].  I Pt has a list I phoned [facility] they do not have beds, pt
states she will phone the other places to follow up. 
 
Pt states she has tooth pain, and needs 7 of her teeth pulled, she went to lunch
and laid down after but did wake easily while we met.

## 2018-04-23 NOTE — CP SOUTH PROGRESS NOTE PSYCH
Psych (Inpt) Progress Note
Progress Note
The patient requested a higher dose of Seroquel (says this is helpful for her 
mood and anxiety. Denies sedation. Reports unusual feeling in her chest. Denies 
SI/HI. No AVH. 
 
Vital Signs:
 
 
Date Time Temp Pulse B/P
 
04/23 0808 98.5 74 118/68
 
04/22 2235 99.1  
 
04/22 1952 99.1 85 127/68
 
 
Mental status exam: 
The patient was well groomed, not sedated appearing today, no abnormal 
movements. Fair eye contact. Speech minimally slurred otherwise wnl. Mood 
"getting better". Affect mildly constricted, non-labile. TP logical, TC wnl. 
Denies SI/HI. Denies perceptual disturbances. Cognition grossly intact. 
 
Assessment and Plan: 
Increase seroquel to 100 mg three times daily
 
 
 
 
 
Mental status exam: Well groomed AAF, not sedated appearing today, no abnormal 
movements. Fair eye contact. Speech minimally slurred otherwise wnl. Mood 
"getting better". Affect mildly constricted, non-labile. TP logical, TC wnl. 
Denies SI/HI. Denies perceptual disturbances. Cognition grossly intact. I/J 
fair.
 
Assessment and plan: 
Mood continues to improve as does sedation. 
Will uptitrate seroquel to 100 mg BID. 
Also check EKG. I have very low suspicion of a cardiac event as the cause of her
chest discomfort. 
Continue remainder of current management as per primary team.

## 2018-04-24 VITALS — DIASTOLIC BLOOD PRESSURE: 62 MMHG | SYSTOLIC BLOOD PRESSURE: 101 MMHG

## 2018-04-24 VITALS — SYSTOLIC BLOOD PRESSURE: 115 MMHG | DIASTOLIC BLOOD PRESSURE: 53 MMHG

## 2018-04-24 VITALS — DIASTOLIC BLOOD PRESSURE: 56 MMHG | SYSTOLIC BLOOD PRESSURE: 99 MMHG

## 2018-04-24 VITALS — SYSTOLIC BLOOD PRESSURE: 117 MMHG | DIASTOLIC BLOOD PRESSURE: 71 MMHG

## 2018-04-24 VITALS — DIASTOLIC BLOOD PRESSURE: 63 MMHG | SYSTOLIC BLOOD PRESSURE: 109 MMHG

## 2018-04-24 NOTE — SOCIAL WORKER PROG NOTE PSYCH
Social Work Progress Note
Progress Note
CHEYENNE GIANG
[id number]
1975
CHEYENNE GIANG 
[id number]
 
Pended Authorization #
Client Authorization #
Type of Request
 
[id number]
[id number]
CONCURRENT
 
 
Date of Admission/ Start of Services
Requested From
Submission Date
   
04/17/2018
04/24/2018
04/24/2018

## 2018-04-24 NOTE — CP SOUTH PROGRESS NOTE PSYCH
Psych (Inpt) Progress Note
Progress Note
 
The patient's progress, treatment plan, and aftercare plans were discussed and 
the treatment team meeting this morning. 
 
Vital Signs:
 
 
Date Time Temp Pulse B/P
 
04/24 0749 97.8 95 101/62
 
 
Mental Status Exam (11:50 AM-12:10 Noontime): 
The patient was well groomed. She was not sedated. No abnormal movements. The 
patient spoke at a normal clip, she was not pressured, she was not slurred. She 
said her mood was "much better".  She showed a full range of affect.  She was 
coherent, without any thought disorder.  She denied feeling paranoid, and there 
were no delusions during the interview.  She denied hallucinations and she did 
not seem to be responding to internal stimuli.  She reported that she starting 
to see a glimmer of hope and denied wishing death or thinking of suicide in the 
past 24 hours.  She reports that she is still very motivated to go to a 
residential rehabilitation program.  She seemed to have reasonable attention and
concentration, no difficulties with information processing, and no evidence of 
short-term memory deficits. 
 
Assessment 
42-year-old  black female with significant history of substance abuse 
presented to the emergency room with some thoughts of suicide in the context of 
relapsing
 
Diagnoses:
Unspecified Depressive DO 
Opioid Use DO
Stimulant Use DO 
Cannabis Use DO
 
Treatment Plan Update: 
Continue Seroquel 100 mg three times daily
Continue Lexapro 20 mg daily

## 2018-04-24 NOTE — SOCIAL WORKER PROG NOTE PSYCH
Social Work Progress Note
Progress Note
Cydney and I met to follow up on her referrals to rehab.  She had not made any
calls as of 1pm, so we made calls together.  Left messages with the admissions 
coordinators for [facility], [facility], and [facility].  We spoke with someone 
in admissions at [facility].  She said they are awaiting documentation regarding 
her Methadone being discontinued and updated progress notes.  They reported 
having a female opening.  The information requested was faxed to [phone].  
Additionally Cydney agreed for me to refer her to [facility] in 
[place].  Clinical was faxed this afternoon.  Also faxed referrals for the [place] 
and [facility].  Called [facility] admissions to ensure
they rec'd the faxed information.  Admissions confirmed and said the nurse was 
reviewing it and would call.

## 2018-04-25 VITALS — SYSTOLIC BLOOD PRESSURE: 102 MMHG | DIASTOLIC BLOOD PRESSURE: 54 MMHG

## 2018-04-25 VITALS — DIASTOLIC BLOOD PRESSURE: 63 MMHG | SYSTOLIC BLOOD PRESSURE: 122 MMHG

## 2018-04-25 VITALS — DIASTOLIC BLOOD PRESSURE: 85 MMHG | SYSTOLIC BLOOD PRESSURE: 120 MMHG

## 2018-04-25 VITALS — DIASTOLIC BLOOD PRESSURE: 61 MMHG | SYSTOLIC BLOOD PRESSURE: 107 MMHG

## 2018-04-25 NOTE — CP SOUTH PROGRESS NOTE PSYCH
Psych (Inpt) Progress Note
Progress Note
The patient's progress, treatment plan, and aftercare plans were discussed and 
the treatment team meeting this morning. 
 
Vital Signs: Temp: 98.6F, Pulse: 60 bpm, B/P: 102/54 mmHg
 
Mental Status Examination: 
The patient was alert and oriented to time, place, and person.  She spoke at a 
normal rate, she was not pressured, she was not slurred. She said her mood was 
"okay" but she was irritable reportedly because of pain in her teeth.  
She was coherent/no thought disorder.  She denied feeling paranoid, and there 
were no delusions during the interview.  She denied hallucinations and she did 
not seem to be responding to internal stimuli.  She denied wishing death or 
thinking of suicide in the past 48 hours.  She reports that she is still very 
motivated to go to a residential rehabilitation program.  She seemed to have 
reasonable attention and concentration, no difficulties with information 
processing, and no evidence of short-term memory deficits. 
 
Assessment 
A 42-year-old  Black female who was admitted to the inpatient 
psychiatric unit at [facility] on 4/17/2018 because she was having 
thoughts of suicide at that time.
The patient has significant history of substance abuse 
She is showing gradual improvement and is interested in going to a residential 
rehab program.  She is completely off the methadone her last dose was on Sunday 4/22/ 2018
 
 
Diagnoses:
Unspecified Depressive DO 
Opioid Use DO
Stimulant Use DO 
Cannabis Use DO
 
Treatment Plan Update: 
Continue Seroquel 100 mg three times daily
Continue Lexapro 20 mg daily
Discontinue Wellbutrin, replace with diclofenac sodium 75 mg twice daily and 
Ultram 50 mg 4 times a day

## 2018-04-25 NOTE — SOCIAL WORKER PROG NOTE PSYCH
Yulissa RAYATierney 04/25/18 1103:
Social Work Progress Note
Progress Note
Spoke with Johnathon from the [facility].  He reported that they are currently 
full and don't anticipate any openings this week.  
Called Lisa Mccoy [facility].  Cydney would need to be clean for at least 30 
days before an admission there.  She recommended following up with [facility] 
[facility]. 
 
Met with Cydney who was coloring at the kitchen table.  She was a little 
irritable, stating she wasn't feeling well due to being off her Methadone.  She 
reported symptoms of sweats and loose stools.  She expressed feeling upset over 
being taken off Methadone completely.  She asked why she had to come off 
Methadone?  I told her this was the administrative protocol for someone that has
been using drugs and is on Methadone.  Provided encouragement that she can do 
this and that this will pass.  Encouraged her to make additional calls to rehabs
today to schedule screenings.  We made 3 calls together and I left messages for 
admissions coordinators at [facility] and [facility].  During this timeframe 
Cydney was nodding off in the chair.  I consistently had to ask her to wake 
up.  She appeared over sedated.

## 2018-04-25 NOTE — SOCIAL WORKER PROG NOTE PSYCH
Social Work Progress Note
Progress Note
[facility]  Ph#[phone] spoke [facility] Lidia.  She stated 
Lizzie will call back, no beds.  Apparently [facility] in past and "it did not go well." 
 
[facility] - [facility]  Ph#[phone] - she stated no beds today, not 
sure about rest of week bed availability.  She will call Patti Duenas LCSW back 
after she reads clinical.  [facility] understands that plan is for rehab. 
 
TC- [facility] Ph#[phone] - left voicemail for beds for patient.  
Asked for call back to Patti Duenas. ELVER.

## 2018-04-26 VITALS — SYSTOLIC BLOOD PRESSURE: 121 MMHG | DIASTOLIC BLOOD PRESSURE: 60 MMHG

## 2018-04-26 VITALS — SYSTOLIC BLOOD PRESSURE: 109 MMHG | DIASTOLIC BLOOD PRESSURE: 53 MMHG

## 2018-04-26 VITALS — DIASTOLIC BLOOD PRESSURE: 70 MMHG | SYSTOLIC BLOOD PRESSURE: 112 MMHG

## 2018-04-26 NOTE — PATIENT DISCHARGE INSTRUCTIONS
Psych Discharge Inst
 
General Discharge Information
Reason for Admission:
thoughts of suicide
Psy Discharge Primary Diag+ unspecified depressive di
Psy Discharge Secondary Diag+ Opioid Dependence, Cocaine Use Disorder
Summary Tests/Major Procedures
There is no significant abnormalities in the laboratory testing
Studies Pending at DC:
None
 
Patient Instructions
Contact Information
Your Psychiatrist on [facility] was Gharaibeh MD,Numan
 
* If you are experiencing an emergency related to this hospitalization, please 
call [phone] to contact the treating psychiatrist or the psychiatrist-on-
call.
 
* To Request a copy of your medical records, please contact the Medical Records 
Department at [phone].
 
* To request results of studies pending at the time of discharge, please call 
[phone].
 
* Continue your Medications until directed to stop by your Healthcare provider.
 
General Medication Information
Please continue to take your new medications and your continued home medications
, unless otherwise indicated on your discharge medication list, or unless 
directed by your MD or APRN to stop them.
 
 
Special Instructions
Diet Regular
Activity Normal
- Tobacco Use Treatment Offered
Post DC Medications Offered: Script Given-See Med List
Post DC Tobacco Treatment Plan: Refused Tobacco Tx Pgm
- EtOH/Drug Use D/O Treatment Offered
Post DC Medications Offered: Script Given-See Med List
Post DC EtOH/SubAbuse TX Plan: Other SubAbuse/Dual Pgm
Metabolic Screening
Patient on a neuroleptic(s) .
     Enter below results for Hemoglobin A1C, 
     and lipid panel if obtained during the last 365 days.
 
BMI: 23.200     
 
Blood Pressure: 109/53
 
Laboratory Results From [facility] (If applicable):
 
Lab
 
 
Cholesterol 137 MG/DL 03/13/18 1245
 
Cholesterol/HDL Ratio 3 % 03/13/18 1245
 
HDL Cholesterol 51 mg/dL 03/13/18 1245
 
Hemoglobin A1c 5.7 % 03/13/18 1245
 
LDL Cholesterol, Calc 66 mg/dL 03/13/18 1245
 
Triglycerides 102 mg/dL 03/13/18 1245
 
Methadone Screen > 735 NG/ML H 03/13/18 1246
 
Ur Phencyclidine Scrn > 72.00 NG/ML H 03/13/18 1246
 
Urine Cocaine Screen > 1000 NG/ML H 03/13/18 1246
 
 
 
Advance Directives
Does the Patient have Medical Advance Directives No/Refused further info
Does Pt have Psychiatric Advance Directives? No/Refused further info
Does Patient have a Designated Surrogate Decision Maker: No
Information About Psychiatric Advance Directives Provided? Refused
 
Discharge Plan
Post Hospital Treatment Plan:
[facility]

## 2018-04-26 NOTE — SOCIAL WORKER PROG NOTE PSYCH
Social Work Progress Note
Progress Note
Cydney would like to return to [facility], she states she enjoys the groups and the 
program, she is hoping to work with her counselor to continue her rehab search. 
She indicates she would like to return home to her daughters home in [place]. 
Cathi is expected to call to confirm.  She left a voicemail with Yael from [facility] 
requesting her re acceptance. Pt had been accepted to [facility], 
she refused this option. "Im not going anywhere else but home". Pt denies si/hi/
ah/vh.

## 2018-04-26 NOTE — SOCIAL WORKER PROG NOTE PSYCH
Social Work Progress Note
Progress Note
Called [facility] this morning.  Spoke with Ludmila.  She reported that Allison 
the admission's coordinator was out this week.  I asked who was handling 
referrals?  She said Darline would most likely be and she will have her call me 
back.  A few minutes later Ludmila called and reported that the new process is for 
the patient to fill out an application and fax it back.  They will then call to 
let us know if she has been accepted or not.  
 
Cydney was accepted at [facility], but refused the bed.  
Stated she wants to return home to [place].  Asked that her daughter be 
contacted to discuss this plan.  She attempted to call, but didn't get through. 
I ended up speaking with Cydney's daughter around 2pm.  I explained that she 
passed on a bed for [facility] today and that she wants to return to be 
with her in [place].  Mandi was not in agreement with this plan.  She said she 
really didn't want her Mom staying there.  I told her that I had already set up 
transportation and she is scheduled to come to her address.  If she doesn't want
to welcome here in, she can let her know and she can go to a shelter.  At that 
point she said she was calling her Grandfather to see if he could do anything to
help with housing.  I told Cydney of the outcome of this conversation.  She 
did not see concerned and stated her daughter always reacts this way and will 
change her mind.  
 
Called [facility] [employer] transport to schedule transportation home.  Confirmation number 
[id number].  Asked for a 2pm p/u.
 
There were problems with transportation from [employer].  Patient is still waiting for
ride as of 5pm.

## 2018-04-26 NOTE — DISCHARGE SUMMARY REPORT-PSYCH
Visit Information
 
Visit Dates/Diagnosis'
Admission Date:
04/17/18
 
Discharge Date: 04/26/18
Reason for Admission:
thoughts of suicide
Psy Discharge Primary Diag: unspecified depressive di
Psy Discharge Secondary Diag: Opioid Dependence, Cocaine Use Disorder
 
Hospital Course
 
Course
Allergies:
Coded Allergies:
latex (Severe, ITCHY RASH 08/02/17)
lactose (Severe, LACTOSE INTOLERANT 08/02/17)
tramadol (Severe, ITCHING 08/02/17)
 
Hospital Course/TX Response:
 
The patient's progress, treatment plan, and aftercare plans were discussed and 
the treatment team meeting this morning. 
 
Vital Signs: Temp: 97.3F, Pulse: 98 bpm, B/P: 109/53 mmHg
 
Mental Status Examination: 
The patient was alert and oriented to time, place, and person.  She spoke at a 
normal rate, she was not pressured, she was not slurred. She said her mood was 
"okay". She denied wishing death or thinking of suicide in the past 72 hours. 
In the morning report the team reported that the patient has been irritable , 
entitled, and disrespectful.
The patient reportedly that she has been irritable because of pain in her teeth.
 
She was coherent/no thought disorder.  She denied feeling paranoid, and there 
were no delusions during the interview.  
The patient denied hallucinations and she did not seem to be responding to 
internal stimuli.   
She seemed to have reasonable attention and concentration, no difficulties with 
information processing, and no evidence of short-term memory deficits. 
 
Assessment 
A 42-year-old  Black female who was admitted to the inpatient 
psychiatric unit at [facility] on 4/17/2018 because she was having 
thoughts of suicide at that time.
The patient has significant history of substance abuse 
She is showing gradual improvement and change her mind about going to 
residential rehabilitation program.  Today she was offered a bed that "at the 
[facility] but she declined and insisted reported that she wants 
to go home (it's not clear what she meant by that but it sounded like it's going
back to her daughter's)
 
Diagnoses:
Unspecified Depressive DO 
Opioid Use DO
Stimulant Use DO 
Cannabis Use DO
 
Treatment Plan Update: 
May be discharged home to continue treatment with intensive outpatient program 
at [facility]
 
 
Discharge HBIPS
- Tobacco Use Treatment Offered
Post DC Medications Offered: Script Given-See Med List
Post DC Tobacco Treatment Plan: Refused Tobacco Tx Pgm
- EtOH/Drug Use D/O Treatment Offered
Post DC Medications Offered: Script Given-See Med List
Post DC EtOH/SubAbuse TX Plan: Other SubAbuse/Dual Pgm
 
Metabolic Screening
- Screen if on a Neuroleptic Medication
- Metabolic screening should include:
- Blood Pressure, BMI, Glucose or Hgb A1c, & a
- Lipid profile from within the past 365 days.
Metabolic Screening
Patient on a neuroleptic(s) .
     Enter below results for Hemoglobin A1C, 
     and lipid panel if obtained during the last 365 days.
 
BMI: 23.200     
 
Blood Pressure: 109/53
 
Laboratory Results From [facility] (If applicable):
 Lab
 
 
Amylase 54 U/L 05/27/17 0025
 
Cholesterol 137 MG/DL 03/13/18 1245
 
Cholesterol/HDL Ratio 3 % 03/13/18 1245
 
HDL Cholesterol 51 mg/dL 03/13/18 1245
 
Hemoglobin A1c 5.7 % 03/13/18 1245
 
LDL Cholesterol, Calc 66 mg/dL 03/13/18 1245
 
Triglycerides 102 mg/dL 03/13/18 1245
 
 
 
 
Discharge Instructions
 
General Discharge Information
Multiple Neuroleptics:
([X]) Not Applicable
      
   
 
Discharge Diet Regular
Discharge Activity Normal
DC Disposition:
Discharge home to her daughter's home
Referrals
Ordered Referrals
Provider Referral 04/27/18
For Groups:
[[facility]]
 
[facility] for mental health and 
substance use [facility]
 
[address]
 
[place], [place] 
 
[phone]
 
 
 
Prescriptions
Stop taking the following medications:
Escitalopram Oxalate (Lexapro) 10 MG TABLET ORAL DAILY Qty = 45
 
Continue taking these medications:
Albuterol Sulfate (Ventolin Hfa) 90 MCG HFA.AER.AD
    2 Puff Inhale through mouth Every 4 hours as needed for SHORTNESS OF BREATH
    Qty = 1
    This prescription has been renewed
 
Nicotine (Nicotine Patch) 14 MG/24 HOUR PATCH.TD24
    14 Milligram On the skin DAILY
    Qty = 14
    This prescription has been renewed
 
Quetiapine Fumarate (Quetiapine Fumarate) 100 MG TABLET
    100 Milligram ORAL THREE TIMES DAILY
    Qty = 45
    This prescription has been renewed
 
Pantoprazole Sodium (Protonix) 40 MG TABLET.DR
    1 Tablet ORAL DAILY
    Qty = 30
    This prescription has been renewed
 
Start taking the following new medications:
Ibuprofen (Ibuprofen) 800 MG TABLET
    800 Milligram ORAL EVERY 4 HOURS AS NEEDED as needed for teeth pain (Max. 
3200 mg/day)
    Qty = 30
    No Refills
 
Clonidine HCl (Clonidine HCl) 0.1 MG TABLET
    0.1 Milligram ORAL EVERY SIX HOURS AS NEEDED as needed for opioid withdrawal
    Qty = 30
    No Refills
 
Gabapentin (Gabapentin) 300 MG CAPSULE
    900 Milligram ORAL EVERY SIX HOURS AS NEEDED as needed for anxiety or 
irritability
    Qty = 60
    No Refills
 
Trazodone HCl (Trazodone HCl) 50 MG TABLET
    50 Milligram ORAL AT BEDTIME as needed for INSOMNIA
    Qty = 15
    No Refills
 
Multivitamin (One Daily Multivitamin) 1 EACH TABLET
    1 Tablet ORAL DAILY
    Qty = 30
    No Refills
 
 
Studies Pending at Discharge
None
Copies To:
[facility]

## 2018-04-26 NOTE — CP SOUTH PROGRESS NOTE PSYCH
Psych (Inpt) Progress Note
Progress Note
 
The patient's progress, treatment plan, and aftercare plans were discussed and 
the treatment team meeting this morning. 
 
Vital Signs: Temp: 97.3F, Pulse: 98 bpm, B/P: 109/53 mmHg
 
Mental Status Examination: 
The patient was alert and oriented to time, place, and person.  She spoke at a 
normal rate, she was not pressured, she was not slurred. She said her mood was 
"okay". She denied wishing death or thinking of suicide in the past 72 hours. 
In the morning report the team reported that the patient has been irritable , 
entitled, and disrespectful.
The patient reportedly that she has been irritable because of pain in her teeth.
 
She was coherent/no thought disorder.  She denied feeling paranoid, and there 
were no delusions during the interview.  
The patient denied hallucinations and she did not seem to be responding to 
internal stimuli.   
She seemed to have reasonable attention and concentration, no difficulties with 
information processing, and no evidence of short-term memory deficits. 
 
Assessment 
A 42-year-old  Black female who was admitted to the inpatient 
psychiatric unit at [facility] on 4/17/2018 because she was having 
thoughts of suicide at that time.
The patient has significant history of substance abuse 
She is showing gradual improvement and change her mind about going to 
residential rehabilitation program.  Today she was offered a bed that "at the 
[facility] but she declined and insisted reported that she wants 
to go home (it's not clear what she meant by that but it sounded like it's going
back to her daughter's)
 
Diagnoses:
Unspecified Depressive DO 
Opioid Use DO
Stimulant Use DO 
Cannabis Use DO
 
Treatment Plan Update: 
May be discharged home to continue treatment with intensive outpatient program 
at [facility]

## 2018-04-27 NOTE — SOCIAL WORKER PROG NOTE PSYCH
Social Work Progress Note
Faxed Referral(s)
   Referred To: [facility]
   Transition of Care Documents sent: Health Summary
   Faxed to: [facility]
   Fax #: [phone]
   Faxed by: REINIER PERRIN LCSW
   Date faxed: 04/27/18
   Time Faxed: [phone]
   Comment: PT DISCHARGED FROM [facility] 6PM ON 4/26 - TRANSPORTATION ISSUES

## 2018-04-27 NOTE — SOCIAL WORKER PROG NOTE PSYCH
Social Work Progress Note
Faxed Referral(s)
   Referred To: [facility]
   Transition of Care Documents sent: Health Summary
   Faxed to: [facility]
   Fax #: [phone]
   Faxed by: Rose Pickard
   Date faxed: 04/27/18
   Time Faxed: [phone]